# Patient Record
Sex: MALE | Race: WHITE | URBAN - METROPOLITAN AREA
[De-identification: names, ages, dates, MRNs, and addresses within clinical notes are randomized per-mention and may not be internally consistent; named-entity substitution may affect disease eponyms.]

---

## 2019-04-22 ENCOUNTER — OFFICE VISIT (OUTPATIENT)
Dept: ORTHOPEDIC SURGERY | Age: 59
End: 2019-04-22

## 2019-04-22 VITALS
SYSTOLIC BLOOD PRESSURE: 129 MMHG | RESPIRATION RATE: 11 BRPM | DIASTOLIC BLOOD PRESSURE: 76 MMHG | TEMPERATURE: 97.8 F | HEART RATE: 66 BPM | HEIGHT: 72 IN | OXYGEN SATURATION: 95 % | BODY MASS INDEX: 33.18 KG/M2 | WEIGHT: 245 LBS

## 2019-04-22 DIAGNOSIS — M76.61 ACHILLES TENDINITIS OF RIGHT LOWER EXTREMITY: Primary | ICD-10-CM

## 2019-04-22 DIAGNOSIS — M25.571 ACUTE RIGHT ANKLE PAIN: ICD-10-CM

## 2019-04-22 RX ORDER — TELMISARTAN 20 MG/1
TABLET ORAL DAILY
COMMUNITY

## 2019-04-22 RX ORDER — ESOMEPRAZOLE MAGNESIUM 40 MG/1
CAPSULE, DELAYED RELEASE ORAL DAILY
COMMUNITY

## 2019-04-22 RX ORDER — IBUPROFEN 800 MG/1
800 TABLET ORAL
Qty: 60 TAB | Refills: 1 | Status: CANCELLED | OUTPATIENT
Start: 2019-04-22
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
## 2019-04-22 NOTE — PROGRESS NOTES
1. Have you been to the ER, urgent care clinic since your last visit? Hospitalized since your last visit? No    2. Have you seen or consulted any other health care providers outside of the [address] [place] [address] since your last visit? Include any pap smears or colon screening.  No

## 2022-07-27 ENCOUNTER — HOSPITAL ENCOUNTER (OUTPATIENT)
Dept: PHYSICAL THERAPY | Age: 62
Discharge: HOME | End: 2022-07-27
Payer: COMMERCIAL

## 2022-07-27 PROCEDURE — 97162 PT EVAL MOD COMPLEX 30 MIN: CPT

## 2022-07-27 NOTE — PROGRESS NOTES
[facility]  [address] [facility] [address]  [place], [address]  [phone] fax    Plan of Care/ Statement of Necessity for Physical Therapy Services    Patient name: Theodora Greenwood Start of Care: 2022   Referral source: Trent Gonzalez* : 1960    Medical Diagnosis: s/p tendon repair  Payor: Sherin Chowdary / Plan: [employer] / Product Type: HMO /  Onset Date:7/15/2022    Treatment Diagnosis: right distal biceps tendon repair   Prior Hospitalization: see medical history Provider#: [id number]   Medications: Verified on Patient summary List    Comorbidities: HTN, distal biceps tendon repair   Prior Level of Function: The patient reports inability to use right elbow/arm due to tear of biceps pre-operatively. The Plan of Care and following information is based on the information from the initial evaluation. Assessment/ key information: The patient is a 59year old left handed male s/p right distal biceps repair performed on 7/15/2022. He indicates he has been doing well overall since surgery until a brace was placed on his elbow at last follow-up and he felt the straps were too tight, his right hand swelled significantly and he experienced skin break down where the straps contacted his forearm in 3 different areas. Since that time he has loosened them up somewhat and placed moleskin under the straps to allow for improved comfort. He did not bring protocol with him to session. The patient presents steri strips over incision over distal biceps region. The proximal arm of his brace was extended to allow for improved stability about the elbow. New moleskin was cut and placed beneath the straps to allow for reduced friction and to mitigate further skin break down. No further objective data was collected today due to absence of protocol, though patient was educated regarding no active flexion, to remain in brace, and to avoid active supination.  The patient has impairments related to the procedure consisting of pain, decreased ROM, decreased flexibility, decreased strength, decreased ease of ADLs, limited quality of life. A call has been placed to MD office to obtain op report, protocol, as well as an OT order due to forearm and elbow speciality. Evaluation Complexity History MEDIUM  Complexity : 1-2 comorbidities / personal factors will impact the outcome/ POC ; Examination MEDIUM Complexity : 3 Standardized tests and measures addressing body structure, function, activity limitation and / or participation in recreation  ;Presentation MEDIUM Complexity : Evolving with changing characteristics  ; Clinical Decision Making MEDIUM Complexity : FOTO score of 26-74  Overall Complexity Rating: MEDIUM  Problem List: pain affecting function, decrease ROM, decrease strength, edema affecting function, impaired gait/ balance, decrease ADL/ functional abilitiies, decrease activity tolerance, decrease flexibility/ joint mobility, and decrease transfer abilities   Treatment Plan may include any combination of the following: Therapeutic exercise, Therapeutic activities, Neuromuscular re-education, Physical agent/modality, Manual therapy, Patient education, Self Care training, and Functional mobility training  Patient / Family readiness to learn indicated by: asking questions, trying to perform skills, and interest  Persons(s) to be included in education: patient (P)  Barriers to Learning/Limitations: yes;  none  Patient Goal (s): regain flexibility of tendon begin regaining strength. Return to normal arm use.   Patient Self Reported Health Status: good  Rehabilitation Potential: good    Goals: The patient will verbalize understanding and demonstrate compliance of precautions in order to reduce risk of injury during passive phase. Met     Frequency / Duration: Patient to be seen 1 times per week for 1 treatments.     Patient/ Caregiver education and instruction: Diagnosis, prognosis, self care, activity modification, brace/ splint application, and exercises   [x]  Plan of care has been reviewed with KRAIG Guerrero, PT 7/27/2022 9:22 AM    ________________________________________________________________________    I certify that the above Therapy Services are being furnished while the patient is under my care. I agree with the treatment plan and certify that this therapy is necessary.     [de-identified] Signature:____________________  Date:____________Time: _________     Codi Villavicencio*  Please sign and return to [place] [id number] [facility]  [address] [facility] [address]  [place], [address]  [phone] fax

## 2022-07-27 NOTE — PROGRESS NOTES
PT DAILY TREATMENT NOTE     Patient Name: Tanner Ortiz  Date:2022  : 1960  [x]  Patient  Verified  Payor: Abe Munguia / Plan: [employer] / Product Type: HMO /    In time:8:30  Out time:9:15  Total Treatment Time (min): 45  Visit #: 1 of 1    Treatment Area: s/p tendon repair    SUBJECTIVE  Pain Level (0-10 scale): 0/10  Any medication changes, allergies to medications, adverse drug reactions, diagnosis change, or new procedure performed?: [x] No    [] Yes (see summary sheet for update)  Subjective functional status/changes:   [] No changes reported  The patient has a chief complaint of swelling and skin irritation from brace s/p right distal biceps tendon repair 7/15/2022. OBJECTIVE  30 min [x]Eval                  []Re-Eval       15 min Therapeutic Activity:  [x]  See flow sheet :education and refitting of brace to reduce skin break down and allow for improved stability of right elbow. Rationale:  improve fitting of brace   to improve the patients ability to reduce re-injury. With   [] TE   [] TA   [] neuro   [] other: Patient Education: [x] Review HEP    [] Progressed/Changed HEP based on:   [] positioning   [] body mechanics   [] transfers   [] heat/ice application    [] other:      Other Objective/Functional Measures: See IE     Pain Level (0-10 scale) post treatment: 0/10    ASSESSMENT/Changes in Function: See POC    Patient will continue to benefit from skilled PT services to modify and progress therapeutic interventions, address functional mobility deficits, address ROM deficits, address strength deficits, analyze and address soft tissue restrictions, analyze and cue movement patterns, analyze and modify body mechanics/ergonomics, assess and modify postural abnormalities, and instruct in home and community integration to attain remaining goals.      [x]  See Plan of Care  []  See progress note/recertification  []  See Discharge Summary         Progress towards goals / Updated goals:  Goals: The patient will verbalize understanding and demonstrate compliance of precautions in order to reduce risk of injury during passive phase. Met        PLAN  [x]  Upgrade activities as tolerated     []  Continue plan of care  []  Update interventions per flow sheet       []  Discharge due to:_  []  Other:_      Mia Vitale, PT 7/27/2022  9:36 AM    No future appointments.

## 2022-07-29 ENCOUNTER — HOSPITAL ENCOUNTER (OUTPATIENT)
Dept: PHYSICAL THERAPY | Age: 62
Discharge: HOME | End: 2022-07-29
Payer: COMMERCIAL

## 2022-07-29 PROCEDURE — 97110 THERAPEUTIC EXERCISES: CPT

## 2022-07-29 PROCEDURE — 97535 SELF CARE MNGMENT TRAINING: CPT

## 2022-07-29 PROCEDURE — 97165 OT EVAL LOW COMPLEX 30 MIN: CPT

## 2022-07-29 NOTE — PROGRESS NOTES
[facility]  [address]  [place], [address]  [phone] fax    Plan of Care/Statement of Necessity for Occupational Therapy Services    Patient name: Joanette Boast Start of Care: 2022   Referral source: Bubba Nolasco* : 1960    Medical Diagnosis: Pain in right elbow [M25.521]  Payor: Joleen Vega / Plan: [address] [employer] / Product Type: HMO /  Onset Date:2022    Treatment Diagnosis: right elbow pain   Prior Hospitalization: see medical history Provider#: [id number]   Medications: Verified on Patient summary List    Comorbidities: Heart disease, HTN   Prior Level of Function:  (I) with ADL/IADL tasks without functional limitations and pain using right UE, walking, biking, cooking        The Plan of Care and following information is based on the information from the initial evaluation. Assessment/ key information:   Patient is a left hand dominant 59 y.o. male with a chief complaint  of right arm pain s/p right distal biceps repair on 7/15/2022. Pt reports initial injury in 2022 when he went to try to stop his large dog from running out the door and her felt a painful pop in the arm. Imaging revealed a partial tear and he was scheduled for follow up. While waiting for follow up, he had two more instances of painful popping in the right elbow and an MRI revealed a complete rupture of the distal biceps tendon which lead to surgical intervention. Pt presents to skilled OT today rating his pain level 1-2/10 in the right arm with an elbow brace donned adjusted at 90 degrees. There is moleskin covering 3 wounds about the volar forearm from how the elbow brace was fitting prior to skilled services. There are steri strips over the prox forearm at the distal biceps repair that are intact. He is able to oppose the thumb to all finger tips and make a composite fist.  There is moderate post op edema noted in the right UE.   Patient received an initial evaluation today followed by education as to diagnosis, precautions and treatment plan. Patient was provided with a basic home exercise program including shoulder and hand AROM. Pt issued compression garments for wear on the right elbow, FA, wrist and hand. Pt educated on edema mgmt strategies. Skilled OT services are necessary to address aforementioned deficits and improve quality of life. Evaluation Complexity: History LOW Complexity : Brief history review  Examination LOW Complexity : 1-3 performance deficits relating to physical, cognitive , or psychosocial skils that result in activity limitations and / or participation restrictions  Clinical Decision Making MEDIUM Complexity : Patient may present with comorbidities that affect occupational performnce. Miniml to moderate modification of tasks or assistance (eg, physical or verbal ) with assesment(s) is necessary to enable patient to complete evaluation   Overall Complexity Rating: LOW   Patient would benefit from OT/Hand therapy services for the following problems:  Problem List: Pain effecting function, Decreased range of motion, Decreased strength, Edema effecting function, Decreased coordination/prehension, Decreased ADL/functional abilities , Decreased activity tolerance, Decreased flexibility/joint mobility, and Sensability   Treatment Plan may include any combination of the following: Therapeutic exercise, Therapeutic activities, Neuromuscular re-education, Physical agent/modality, Scar management, Manual therapy, Patient education, and ADLs/IADLs  Patient / Family readiness to learn indicated by: asking questions, trying to perform skills, and interest  Persons(s) to be included in education:   patient (P)  Barriers to Learning/Limitations: None  Patient Goal (s): regain flexibility in tendon. Begin to regain stregnth. Return to normal arm function/use.   Patient Self Reported Health Status: good  Rehabilitation Potential: good  Short Term Goals: To be accomplished in 2  weeks:  Goal:* Patient will be compliant with initial home exercise program to take an active role in their rehabilitation process. Status at Eval: Patient was provided with a basic home exercise program including shoulder and hand AROM. Goal:* Patient will demonstrate a good understanding of their condition and strategies for self-management. Status at Eval: pt educated on  prognosis, diagnosis, activity modifications, treatment plan, precautions, edema mgmt    Long Term Goals: To be accomplished in 4 weeks:    Goal:* Patient will have 70 degrees of right forearm supination in order to perform ADL's including washing face and accepting change. Status at Eval: not assessed due to protocol from referring provider, see chart    Goal:* Patient will have 70 degrees of right forearm pronation in order to type and utilize computer mouse as well as bear wt thru the hand when pushing up from a chair. Status at Eval: not assessed due to protocol from referring provider, see chart    Goal:* Patient will have 15 degrees of right elbow extension in order to increase ease with ADL's such as bathing, eating and dressing and to eventually bear weight thru the right upper extremity as in pushing up from a chair. Status at Eval: Status at Eval: not assessed due to protocol from referring provider, see chart    Goal:* Patient will have 130 degrees of right elbow flexion in order to perform hygiene tasks and /or work with tools such as a screwdriver. Status at Eval:Status at Eval: not assessed due to protocol from referring provider, see chart    Goal:* Patient will demonstrate a 0.4 cm decrease in edema of the right wrist and distal palmar crease and the elbow in order to improve use of right UE for grooming, bathing and dressing tasks.   Status at Eval: [facility] 23.5, wrist 20.2, elbow 34.3 cm    Goal:* Patient will show a 20 point improvement on FOTO functional status measure to improve overall functional performance. Status at [facility]: 26    Frequency / Duration: Patient to be seen 2 times per week for 4 weeks:  Patient/ Caregiver education and instruction: Diagnosis, prognosis, self care, activity modification, and exercises    [x]  Plan of care has been reviewed with Raj Proctor OT 7/29/2022 3:10 PM  ________________________________________________________________________    I certify that the above Therapy Services are being furnished while the patient is under my care. I agree with the treatment plan and certify that this therapy is necessary.     [de-identified] Signature:____________Date:_________TIME:________     [address]  ** Signature, Date and Time must be completed for valid certification **    Please sign and return to [facility] [address] [place] [address] & [facility]  [address]  [place] [address]  [phone] fax

## 2022-07-29 NOTE — PROGRESS NOTES
Hand Therapy Evaluation and Daily Note    Patient Name: Shanda Srivastava  Date:2022  : 1960  Age: 59 y.o.y/o  [x]  Patient  Verified  Payor: Marleny Oiler / Plan: [employer] HMO / Product Type: HMO /    Referring Provider: Kayleigh Butler MD Visit:  2022  Onset Date:  2022  Surgical Date: 7/15/2022  Surgical Procedure: right distal biceps    In time:9:22 AM  Out time:10:03 AM  Total Treatment Time (min): 41  Total Timed Codes (min): 26  1:1 Treatment Time (MC only): 41   Visit #: 1 of 8    Treatment Area: s/p tendon repair    Precautions:    Hand Dominance: left handed   Hand Involved: right    Total Evaluation Time:  15    History of Present Condition:  Patient is a left hand dominant 59 y.o. male with a chief complaint  of right arm pain s/p right distal biceps repair on 7/15/2022. Pt reports initial injury in 2022 when he went to try to stop his large dog from running out the door and her felt a painful pop in the arm. Imaging revealed a partial tear and he was scheduled for follow up. While waiting for follow up, he had two more instances of painful popping in the right elbow and an MRI revealed a complete rupture of the distal biceps tendon which lead to surgical intervention. Pain Rating:   Current: (0-no pain 10-debilitating pain) mild 1-2/10  At best: (0-no pain 10-debilitating pain) mild 1/10  At worst: (0-no pain 10-debilitating pain) severe 8/10  Location: right elbow  Type:  mild   Better with: ibuprofen  Worse with:     Medications/Allergies/Past Medical History:  See chart; reviewed with patient.  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 23.5/22.2    Wrist Crease 20.2/18.7    FA  31.7/29.9    Elbow 34.3/29.9          Special Tests: NA    ADLs  Feeding:        []MaxA   []ModA   []Cory   [] CGA   []SBA   []Dada   [x]Independent  UE Dressing:       []MaxA   []ModA   [x]Cory   [] CGA   []SBA   []Dada   []Independent  LE Dressing:       []MaxA   []ModA   [x]Cory   [] CGA   []SBA   []Dada   []Independent  Grooming:       []MaxA   []ModA   []Cory   [] CGA   []SBA   []Dada   [x]Independent  Toileting:       []MaxA   []ModA   []Cory   [] CGA   []SBA   []Dada   [x]Independent  Bathing:       []MaxA   []ModA   [x]Cory   [] CGA   []SBA   []Dada   []Independent  Light Meal Prep:    []MaxA   []ModA   [x]Cory   [] CGA   []SBA   []Dada   []Independent  Household/Other: []MaxA   []ModA   [x]Cory   [] CGA   []SBA   []Dada   []Independent  Adaptive Equip:     []MaxA   []ModA   []Cory   [] CGA   []SBA   []Dada   []Independent  Driving:       []MaxA   []ModA   []Cory   [] CGA   []SBA   []Dada   [x]Independent      Todays Treatment:  Patient received an initial evaluation today followed by education as to diagnosis, precautions and treatment plan. Patient was provided with a basic home exercise program including shoulder and hand AROM. Pt issued compression garments for wear on the right elbow, FA, wrist and hand. Pt educated on edema mgmt strategies.        OBJECTIVE  8 min Therapeutic Exercise:  [x] See flow sheet :   Rationale: increase ROM to improve the patients ability to move right shoulder and make a composite fist with right hand    18 min Self Care/Home Management: prognosis, diagnosis, activity modifications, treatment plan, precautions, edema mgmt   Rationale:  education   to improve the patients ability to promote healing of surgical sites. With   [] TE   [] TA   [] neuro   [] other: Patient Education: [x] Review HEP    [] Progressed/Changed HEP based on:   [] positioning   [] body mechanics   [] transfers   [] heat/ice application   [] Splint wear/care   [] Sensory re-education   [] scar management      [] other:      Pain Level (0-10 scale) post treatment: 1/10    Patient will continue to benefit from skilled OT services to modify and progress therapeutic interventions, address ROM deficits, address strength deficits, analyze and address soft tissue restrictions, and instruct in home and community integration to attain goals. Assessment: Pt presents to skilled OT today rating his pain level 1-2/10 in the right arm with an elbow brace donned adjusted at 90 degrees. There is moleskin covering 3 wounds about the volar forearm from how the elbow brace was fitting prior to skilled services. There are steri strips over the prox forearm at the distal biceps repair that are intact. He is able to oppose the thumb to all finger tips and make a composite fist.  There is moderate post op edema noted in the right UE. Evaluation Complexity: History LOW Complexity : Brief history review  Examination LOW Complexity : 1-3 performance deficits relating to physical, cognitive , or psychosocial skils that result in activity limitations and / or participation restrictions  Clinical Decision Making MEDIUM Complexity : Patient may present with comorbidities that affect occupational performnce.  Miniml to moderate modification of tasks or assistance (eg, physical or verbal ) with assesment(s) is necessary to enable patient to complete evaluation   Overall Complexity Rating: LOW     Patient would benefit from OT/Hand therapy services for the following problems:  Problem List: Pain effecting function, Decreased range of motion, Decreased strength, Edema effecting function, Decreased coordination/prehension, Decreased ADL/functional abilities , Decreased activity tolerance, Decreased flexibility/joint mobility, and Sensability     Treatment Plan may include any combination of the following: Therapeutic exercise, Therapeutic activities, Neuromuscular re-education, Physical agent/modality, Scar management, Manual therapy, Patient education, and ADLs/IADLs    Patient / Family readiness to learn indicated by: asking questions, trying to perform skills, and interest    Persons(s) to be included in education:   patient (P)    Barriers to Learning/Limitations: None    Patient Goal (s): regain flexibility in tendon. Begin to regain stregnth. Return to normal arm function/use.     Patient Self Reported Health Status: good    Rehabilitation Potential: good    Short Term Goals: To be accomplished in 2  weeks:  Goal:* Patient will be compliant with initial home exercise program to take an active role in their rehabilitation process. Status at [facility]: Patient was provided with a basic home exercise program including shoulder and hand AROM. Goal:* Patient will demonstrate a good understanding of their condition and strategies for self-management. Status at [facility]: pt educated on  prognosis, diagnosis, activity modifications, treatment plan, precautions, edema mgmt    Long Term Goals: To be accomplished in 4 weeks:    Goal:* Patient will have 70 degrees of right forearm supination in order to perform ADL's including washing face and accepting change. Status at [facility]: not assessed due to protocol from referring provider, see chart    Goal:* Patient will have 70 degrees of right forearm pronation in order to type and utilize computer mouse as well as bear wt thru the hand when pushing up from a chair.   Status at [facility]: not assessed due to protocol from referring provider, see chart    Goal:* Patient will have 15 degrees of right elbow extension in order to increase ease with ADL's such as bathing, eating and dressing and to eventually bear weight thru the right upper extremity as in pushing up from a chair. Status at Eval: Status at Eval: not assessed due to protocol from referring provider, see chart    Goal:* Patient will have 130 degrees of right elbow flexion in order to perform hygiene tasks and /or work with tools such as a screwdriver. Status at Eval:Status at Eval: not assessed due to protocol from referring provider, see chart    Goal:* Patient will demonstrate a 0.4 cm decrease in edema of the right wrist and distal palmar crease and the elbow in order to improve use of right UE for grooming, bathing and dressing tasks. Status at Eval: [facility] [place] 23.5, wrist 20.2, elbow 34.3 cm    Goal:* Patient will show a 20 point improvement on FOTO functional status measure to improve overall functional performance.   Status at eval: 26    Frequency / Duration: Patient to be seen 2 times per week for 4 weeks:  Patient/ Caregiver education and instruction: Diagnosis, prognosis, self care, activity modification, and exercises    Deya Mancini OT,  7/29/2022 9:10 AM

## 2022-08-02 ENCOUNTER — HOSPITAL ENCOUNTER (OUTPATIENT)
Dept: PHYSICAL THERAPY | Age: 62
Discharge: HOME | End: 2022-08-02
Payer: COMMERCIAL

## 2022-08-02 PROCEDURE — 97535 SELF CARE MNGMENT TRAINING: CPT

## 2022-08-02 PROCEDURE — 97110 THERAPEUTIC EXERCISES: CPT

## 2022-08-02 PROCEDURE — 97140 MANUAL THERAPY 1/> REGIONS: CPT

## 2022-08-02 NOTE — PROGRESS NOTES
OT DAILY TREATMENT NOTE     Patient Name: Jerry Correia  Date:2022  : 1960  [x]  Patient  Verified  Payor: Flakita Carmel / Plan: [employer] / Product Type: HMO /    In time:10:48  Out time:11:32  Total Treatment Time (min): 44  Visit #: 2 of 8    Treatment Area: Pain in right elbow [M25.521]    SUBJECTIVE  Pain Level (0-10 scale): 1/10  Any medication changes, allergies to medications, adverse drug reactions, diagnosis change, or new procedure performed?: [x] No    [] Yes (see summary sheet for update)  Subjective functional status/changes:   [] No changes reported    \"I have had swelling\"  \"My fingers are stiff\"  \"I go to the doctors on Friday to adjust my elbow brace\"    OBJECTIVE    Modality rationale: decrease edema, decrease inflammation, decrease pain, and increase tissue extensibility to improve the patients ability to move elbow for functional daily tasks.     Min Type Additional Details    [] Estim:  []Unatt       []IFC  []Premod                        []Other:  []w/ice   []w/heat  Position:  Location:    [] Estim: []Att    []TENS instruct  []NMES                    []Other:  []w/US   []w/ice   []w/heat  Position:  Location:    []  Traction: [] Cervical       []Lumbar                       [] Prone          []Supine                       []Intermittent   []Continuous Lbs:  [] before manual  [] after manual    []  Ultrasound: []Continuous   [] Pulsed                           []1MHz   []3MHz W/cm2:  Location:    []  Iontophoresis with dexamethasone         Location: [] Take home patch   [] In clinic   8 with self-care []  Ice     [x]  Heat- MHP   []  Ice massage  []  Laser   []  Paraffin Position: seated/resting  Location: right elbow    []  Laser with stim  []  Other:  Position:  Location:    []  Vasopneumatic Device    []  Right     []  Left  Pre-treatment girth:  Post-treatment girth:  Measured at (location):  Pressure:       [] lo [] med [] hi   Temperature: [] lo [] med [] hi     [x] Skin assessment post-treatment:  [x]intact []redness- no adverse reaction    []redness - adverse reaction:     14 min Therapeutic Exercise:  [] See flow sheet :   Rationale: increase ROM to improve the patients ability to move right shoulder and make a composite fist with right hand    Right UE: (all exercises performed with elbow brace donned)    Shrugs and shoulder rolls   Opposition - all digits   Tendon glides     12 min Manual Therapy:  IASTM #4, #5 , #6 using sweeping fanning techniques over hand wrist and forearm. The manual therapy interventions were performed at a separate and distinct time from the therapeutic activities interventions. Rationale: decrease pain, increase ROM, increase tissue extensibility, and decrease edema  to move wrist and make a composite fist for functional daily tasks. 18 min Self Care/Home Management: treatment plan, edema management, heat modalities, protocol. Rationale:  patient education   to improve the patients ability to self-manage symptoms and improve overall functional use of right UE. With   [] TE   [] TA   [] neuro   [] other: Patient Education: [x] Review HEP    [x] Progressed/Changed HEP based on: initiated tendon glide HEP   [] positioning   [] body mechanics   [] transfers   [] heat/ice application   [] Splint wear/care   [] Sensory re-education   [] scar management      [] other:            Other Objective/Functional Measures: All exercises performed with elbow brace donned  Pt tolerated all shoulder and digit ROM without increased pain     Pain Level (0-10 scale) post treatment: 1/10    ASSESSMENT/Changes in Function: pt familiar and compliant with protocol , precautions, and treatment plan. No increase in pain with shoulder AROM and digit AROM, initiated edema massage in right hand,wrist, and forearm. pt tolerated edema massage well.      Patient will continue to benefit from skilled OT services to address ROM deficits, address strength deficits, analyze and address soft tissue restrictions, analyze and cue movement patterns, and analyze and modify body mechanics/ergonomics to attain remaining goals. [x]  See Plan of Care  []  See progress note/recertification  []  See Discharge Summary         Progress towards goals / Updated goals:    Short Term Goals: To be accomplished in 2  weeks:  Goal:* Patient will be compliant with initial home exercise program to take an active role in their rehabilitation process. Status at Eval: Patient was provided with a basic home exercise program including shoulder and hand AROM.   8/2/2022: initiated tendon glide HEP. Goal:* Patient will demonstrate a good understanding of their condition and strategies for self-management. Status at Eval: pt educated on  prognosis, diagnosis, activity modifications, treatment plan, precautions, edema mgmt  8/2/2022: reviewed edema management strategies and use of heat modalities. Long Term Goals: To be accomplished in 4 weeks:           Goal:* Patient will have 70 degrees of right forearm supination in order to perform ADL's including washing face and accepting change. Status at Eval: not assessed due to protocol from referring provider, see chart. Goal:* Patient will have 70 degrees of right forearm pronation in order to type and utilize computer mouse as well as bear wt thru the hand when pushing up from a chair. Status at Eval: not assessed due to protocol from referring provider, see chart     Goal:* Patient will have 15 degrees of right elbow extension in order to increase ease with ADL's such as bathing, eating and dressing and to eventually bear weight thru the right upper extremity as in pushing up from a chair.   Status at Eval: Status at Eval: not assessed due to protocol from referring provider, see chart     Goal:* Patient will have 130 degrees of right elbow flexion in order to perform hygiene tasks and /or work with tools such as a screwdriver. Status at Eval:Status at Eval: not assessed due to protocol from referring provider, see chart     Goal:* Patient will demonstrate a 0.4 cm decrease in edema of the right wrist and distal palmar crease and the elbow in order to improve use of right UE for grooming, bathing and dressing tasks. Status at Eval: [facility] [place] 23.5, wrist 20.2, elbow 34.3 cm     Goal:* Patient will show a 20 point improvement on FOTO functional status measure to improve overall functional performance.   Status at eval: 26       PLAN  []  Upgrade activities as tolerated     [x]  Continue plan of care  []  Update interventions per flow sheet       []  Discharge due to:_  []  Other:_      KANG Mckeon 8/2/2022  8:27 AM    Future Appointments   Date Time Provider Boyd Martinez   8/2/2022 10:45 AM KANG Rausch [facility]   8/5/2022  3:15 PM Bart Hamming, OT [facility]   8/9/2022 12:15 PM Bart Hamming, OT [facility]   8/12/2022 12:15 PM KANG Rausch [facility]   8/16/2022  3:15 PM Bart Hamming, OT [facility]   8/19/2022  3:15 PM Bart Hamming, OT [facility]   8/23/2022  3:15 PM Bart Hamming, OT [facility]

## 2022-08-05 ENCOUNTER — HOSPITAL ENCOUNTER (OUTPATIENT)
Dept: PHYSICAL THERAPY | Age: 62
Discharge: HOME | End: 2022-08-05
Payer: COMMERCIAL

## 2022-08-05 PROCEDURE — 97140 MANUAL THERAPY 1/> REGIONS: CPT

## 2022-08-05 PROCEDURE — 97535 SELF CARE MNGMENT TRAINING: CPT

## 2022-08-05 PROCEDURE — 97110 THERAPEUTIC EXERCISES: CPT

## 2022-08-05 NOTE — PROGRESS NOTES
OT DAILY TREATMENT NOTE     Patient Name: Littie Oppenheim  Date:2022  : 1960  [x]  Patient  Verified  Payor: Shruthi Westbrook / Plan: [employer] Burrows HMO / Product Type: HMO /    In time:3:20 PM  Out time:4:15 PM  Total Treatment Time (min): 55  Visit #: 3 of 8    Treatment Area: Pain in right elbow [M25.521]    SUBJECTIVE  Pain Level (0-10 scale): 1/10  Any medication changes, allergies to medications, adverse drug reactions, diagnosis change, or new procedure performed?: [x] No    [] Yes (see summary sheet for update)  Subjective functional status/changes:   [] No changes reported  \"The arm is feeling better so I have to remind myself not to use it. \"    \"I don't like the race because my arm swells with it on. \"    OBJECTIVE    Modality rationale: decrease pain and increase tissue extensibility to improve the patients ability to move right UE   Min Type Additional Details    [] Estim:  []Unatt       []IFC  []Premod                        []Other:  []w/ice   []w/heat  Position:  Location:    [] Estim: []Att    []TENS instruct  []NMES                    []Other:  []w/US   []w/ice   []w/heat  Position:  Location:    []  Traction: [] Cervical       []Lumbar                       [] Prone          []Supine                       []Intermittent   []Continuous Lbs:  [] before manual  [] after manual    []  Ultrasound: []Continuous   [] Pulsed                           []1MHz   []3MHz W/cm2:  Location:    []  Iontophoresis with dexamethasone         Location: [] Take home patch   [] In clinic   8 concurrent with self care []  Ice     [x]  Heat MHP  []  Ice massage  []  Laser   []  Paraffin Position: seated, resting  Location: right UE    []  Laser with stim  []  Other:  Position:  Location:    []  Vasopneumatic Device    []  Right     []  Left  Pre-treatment girth:  Post-treatment girth:  Measured at (location):  Pressure:       [] lo [] med [] hi   Temperature: [] lo [] med [] hi       [x] Skin assessment post-treatment:  [x]intact [x]redness- no adverse reaction    []redness - adverse reaction:     15 min Therapeutic Exercise:  [x] See flow sheet :   Rationale: increase ROM to improve the patients ability to move right UE through pain free AROM/AAROM, improve ability to make a composite fist.      15 min Manual Therapy:  retrograde edema massage to right UE   The manual therapy interventions were performed at a separate and distinct time from the therapeutic activities interventions. Rationale: increase tissue extensibility and decrease edema  to right hand digits, wrist, FA and upper arm    25 min Self Care/Home Management: edema mgmt, arm compression garment wear, digit compression garment wear, elbow brace wear   Rationale:  education   to improve the patients ability to reduce edema in right UE and improve compliance with post op protocol and elbow brace wear. With   [] TE   [] TA   [] neuro   [] other: Patient Education: [x] Review HEP    [] Progressed/Changed HEP based on:   [] positioning   [] body mechanics   [] transfers   [] heat/ice application   [] Splint wear/care   [] Sensory re-education   [] scar management      [] other:            Other Objective/Functional Measures:   Reduced edema with retrograde edema massage   Skins wounds healing well    Pain Level (0-10 scale) post treatment: 1/10    ASSESSMENT/Changes in Function: Pt continues to demonstrate edema of right UE. Increased edema noted in digits therefore patient was issued digit compression sleeves for digits 2-5 and educated on wear. No difficulty noted with in hand manipulation exercises. Adjusted brace per post op protocol in chart to 30 deg extension and 90 degrees flexion. Pt provided and instructed on elbow flexion PROM HEP. Issued new compression garments for right wrist and arm and re-educated on wear and care. Pt reports relief after retrograde edema massage and exercises.   Will progress as protocol allows and will continue to address continued edema. Patient will continue to benefit from skilled OT services to modify and progress therapeutic interventions, address ROM deficits, address strength deficits, analyze and address soft tissue restrictions, and instruct in home and community integration to attain remaining goals. []  See Plan of Care  []  See progress note/recertification  []  See Discharge Summary         Progress towards goals / Updated goals:  Short Term Goals: To be accomplished in 2  weeks:  Goal:* Patient will be compliant with initial home exercise program to take an active role in their rehabilitation process. Status at Eval: Patient was provided with a basic home exercise program including shoulder and hand AROM.   8/2/2022: initiated tendon glide HEP. Goal:* Patient will demonstrate a good understanding of their condition and strategies for self-management. Status at Eval: pt educated on  prognosis, diagnosis, activity modifications, treatment plan, precautions, edema mgmt  8/2/2022: reviewed edema management strategies and use of heat modalities. Long Term Goals: To be accomplished in 4 weeks:           Goal:* Patient will have 70 degrees of right forearm supination in order to perform ADL's including washing face and accepting change. Status at Eval: not assessed due to protocol from referring provider, see chart. Goal:* Patient will have 70 degrees of right forearm pronation in order to type and utilize computer mouse as well as bear wt thru the hand when pushing up from a chair. Status at Eval: not assessed due to protocol from referring provider, see chart     Goal:* Patient will have 15 degrees of right elbow extension in order to increase ease with ADL's such as bathing, eating and dressing and to eventually bear weight thru the right upper extremity as in pushing up from a chair.   Status at Eval: Status at Eval: not assessed due to protocol from referring provider, see chart Goal:* Patient will have 130 degrees of right elbow flexion in order to perform hygiene tasks and /or work with tools such as a screwdriver. Status at Eval:Status at Eval: not assessed due to protocol from referring provider, see chart     Goal:* Patient will demonstrate a 0.4 cm decrease in edema of the right wrist and distal palmar crease and the elbow in order to improve use of right UE for grooming, bathing and dressing tasks. Status at Eval: [facility] [place] 23.5, wrist 20.2, elbow 34.3 cm     Goal:* Patient will show a 20 point improvement on FOTO functional status measure to improve overall functional performance.   Status at eval: 26       PLAN  []  Upgrade activities as tolerated     [x]  Continue plan of care  []  Update interventions per flow sheet       []  Discharge due to:_  []  Other:_      Carolynn De La Vega OT 8/5/2022  3:29 PM    Future Appointments   Date Time Provider Boyd Martinez   8/9/2022 12:15 PM [facility]   8/12/2022 12:15 PM [facility]   8/16/2022  3:15 PM [facility]   8/19/2022  3:15 PM [facility]   8/23/2022  3:15 PM Leotis Service, OT [facility]

## 2022-08-09 ENCOUNTER — HOSPITAL ENCOUNTER (OUTPATIENT)
Dept: PHYSICAL THERAPY | Age: 62
Discharge: HOME | End: 2022-08-09
Payer: COMMERCIAL

## 2022-08-09 PROCEDURE — 97140 MANUAL THERAPY 1/> REGIONS: CPT

## 2022-08-09 PROCEDURE — 97535 SELF CARE MNGMENT TRAINING: CPT

## 2022-08-09 PROCEDURE — 97110 THERAPEUTIC EXERCISES: CPT

## 2022-08-09 NOTE — PROGRESS NOTES
OT DAILY TREATMENT NOTE     Patient Name: Marco Sewell  Date:2022  : 1960  [x]  Patient  Verified  Payor: Nai Almendarez / Plan: [employer] / Product Type: HMO /    In time:12:15 PM  Out time:1:01 PM  Total Treatment Time (min): 55  Visit #: 4 of 8    Treatment Area: Pain in right elbow [M25.521]    SUBJECTIVE  Pain Level (0-10 scale): 1/10  Any medication changes, allergies to medications, adverse drug reactions, diagnosis change, or new procedure performed?: [x] No    [] Yes (see summary sheet for update)  Subjective functional status/changes:   [] No changes reported  \"I don't have a lot of pain but the swelling has been hard to manage. \"    \"I wear the compression and try to elevate it. \"    OBJECTIVE            Modality rationale: decrease pain and increase tissue extensibility to improve the patients ability to move right UE   Min Type Additional Details      [] Estim:  []Unatt       []IFC  []Premod                        []Other: []w/ice   []w/heat  Position:  Location:      [] Estim: []Att    []TENS instruct  []NMES                    []Other: []w/US   []w/ice   []w/heat  Position:  Location:      []  Traction: [] Cervical       []Lumbar                       [] Prone          []Supine                       []Intermittent   []Continuous Lbs:  [] before manual  [] after manual      []  Ultrasound: []Continuous   [] Pulsed                           []1MHz   []3MHz W/cm2:  Location:      []  Iontophoresis with dexamethasone        Location: [] Take home patch  [] In clinic    5 concurrent with self care []  Ice     [x]  Heat MHP  []  Ice massage  []  Laser  []  Paraffin Position: seated, resting  Location: right UE      []  Laser with stim  []  Other: Position:  Location:      []  Vasopneumatic Device    []  Right     []  Left  Pre-treatment girth:  Post-treatment girth:  Measured at (location): Pressure:       [] lo [] med [] hi  Temperature: [] lo [] med [] hi          [x] Skin assessment post-treatment:  [x]intact [x]redness- no adverse reaction    []redness - adverse reaction:      16 min Therapeutic Exercise:  [x] See flow sheet :   Rationale: increase ROM to improve the patients ability to move right UE through pain free AROM/AAROM, improve ability to make a composite fist.       15 min Manual Therapy:  IASTM using tool #5 using sweeping and fanning techniques    The manual therapy interventions were performed at a separate and distinct time from the therapeutic activities interventions. Rationale: increase tissue extensibility and decrease edema  to right hand digits, wrist, FA and upper arm     15 min Self Care/Home Management: edema mgmt, arm compression garment wear, digit compression garment wear, elbow brace wear   Rationale:  education   to improve the patients ability to reduce edema in right UE and improve compliance with post op protocol and elbow brace wear. With   [] TE   [] TA   [] neuro   [] other: Patient Education: [x] Review HEP    [] Progressed/Changed HEP based on:   [] positioning   [] body mechanics   [] transfers   [] heat/ice application   [] Splint wear/care   [] Sensory re-education   [] scar management      [] other:            Other Objective/Functional Measures:   Edema: GIRTH CHART measured in cm  Date: 7/29/2022 8/9/2022    Side Right/Left  Right   DPC circum. 23.5/22.2 22.4    Wrist Crease 20.2/18.7 19.7    FA  31.7/29.9  30.3   Elbow 34.3/29.9          31.7         Pain Level (0-10 scale) post treatment: 1/10    ASSESSMENT/Changes in Function: Reduced edema noted in right UE since initial eval. Pt compliant with compression garment wear and reports compliance with edema mgmt strategies including massage, ice and elevation. Good composite fist noted with activity. Mild difficulty manipulating dexterity balls in hand. Pt demo good understanding of elbow flexion/extension AAROM HEP. Will progress as protocol allows. See chart for protocol.     Patient will continue to benefit from skilled OT services to modify and progress therapeutic interventions, address ROM deficits, address strength deficits, analyze and address soft tissue restrictions, and instruct in home and community integration to attain remaining goals. []  See Plan of Care  []  See progress note/recertification  []  See Discharge Summary         Progress towards goals / Updated goals:  Short Term Goals: To be accomplished in 2  weeks:  Goal:* Patient will be compliant with initial home exercise program to take an active role in their rehabilitation process. Status at Eval: Patient was provided with a basic home exercise program including shoulder and hand AROM.   8/2/2022: initiated tendon glide HEP.   8/9/2022 -Pt reports compliance daily, goal met    Goal:* Patient will demonstrate a good understanding of their condition and strategies for self-management. Status at Eval: pt educated on  prognosis, diagnosis, activity modifications, treatment plan, precautions, edema mgmt  8/2/2022: reviewed edema management strategies and use of heat modalities. Long Term Goals: To be accomplished in 4 weeks:           Goal:* Patient will have 70 degrees of right forearm supination in order to perform ADL's including washing face and accepting change. Status at Eval: not assessed due to protocol from referring provider, see chart. Goal:* Patient will have 70 degrees of right forearm pronation in order to type and utilize computer mouse as well as bear wt thru the hand when pushing up from a chair. Status at Eval: not assessed due to protocol from referring provider, see chart     Goal:* Patient will have 15 degrees of right elbow extension in order to increase ease with ADL's such as bathing, eating and dressing and to eventually bear weight thru the right upper extremity as in pushing up from a chair.   Status at Eval: Status at Eval: not assessed due to protocol from referring provider, see chart Goal:* Patient will have 130 degrees of right elbow flexion in order to perform hygiene tasks and /or work with tools such as a screwdriver. Status at Eval:Status at Eval: not assessed due to protocol from referring provider, see chart     Goal:* Patient will demonstrate a 0.4 cm decrease in edema of the right wrist and distal palmar crease and the elbow in order to improve use of right UE for grooming, bathing and dressing tasks. Status at Eval: [facility] 23.5, wrist 20.2, elbow 34.3 cm   8/9/2022 -[facility] 22.4, wrist 19.7, elbow 31.7, goal met for this treatment session    Goal:* Patient will show a 20 point improvement on FOTO functional status measure to improve overall functional performance.   Status at eval: 26       PLAN  []  Upgrade activities as tolerated     [x]  Continue plan of care  [x]  Update interventions per flow sheet       []  Discharge due to:_  []  Other:_      Martinez Whalen OT 8/9/2022  12:08 PM    Future Appointments   Date Time Provider Boyd Martinez   8/9/2022 12:15 PM Chan Hayes, OT [facility]   8/12/2022 12:15 PM KANG Deng [facility]   8/16/2022  3:15 PM Chan Hayes, OT [facility]   8/19/2022  3:15 PM Chan Hayes, OT [facility]   8/23/2022  3:15 PM Chan Hayes, OT [facility]

## 2022-08-12 ENCOUNTER — HOSPITAL ENCOUNTER (OUTPATIENT)
Dept: PHYSICAL THERAPY | Age: 62
Discharge: HOME | End: 2022-08-12
Payer: COMMERCIAL

## 2022-08-12 PROCEDURE — 97140 MANUAL THERAPY 1/> REGIONS: CPT

## 2022-08-12 PROCEDURE — 97535 SELF CARE MNGMENT TRAINING: CPT

## 2022-08-12 PROCEDURE — 97110 THERAPEUTIC EXERCISES: CPT

## 2022-08-12 NOTE — PROGRESS NOTES
OT DAILY TREATMENT NOTE     Patient Name: Jamel Elizabeth  Date:2022  : 1960  [x]  Patient  Verified  Payor: Teodoro Workman / Plan: [employer] / Product Type: HMO /    In time:12:25  Out time:1:04  Total Treatment Time (min): 39  Visit #: 5 of 8    Treatment Area: Pain in right elbow [M25.521]    SUBJECTIVE  Pain Level (0-10 scale): 1/10  Any medication changes, allergies to medications, adverse drug reactions, diagnosis change, or new procedure performed?: [x] No    [] Yes (see summary sheet for update)  Subjective functional status/changes:   [] No changes reported    \"Hand is swollen\"   \"The only issue is wearing my brace\"    OBJECTIVE    Modality rationale: decrease pain and increase tissue extensibility to improve the patients ability to move right UE.     Min Type Additional Details       [] Estim:  []Unatt       []IFC  []Premod                        []Other: []w/ice   []w/heat  Position:  Location:       [] Estim: []Att    []TENS instruct  []NMES                    []Other: []w/US   []w/ice   []w/heat  Position:  Location:       []  Traction: [] Cervical       []Lumbar                       [] Prone          []Supine                       []Intermittent   []Continuous Lbs:  [] before manual  [] after manual       []  Ultrasound: []Continuous   [] Pulsed                           []1MHz   []3MHz W/cm2:  Location:       []  Iontophoresis with dexamethasone        Location: [] Take home patch  [] In clinic     5 concurrent with self care []  Ice     [x]  Heat MHP  []  Ice massage  []  Laser  []  Paraffin Position: seated, resting  Location: right UE       []  Laser with stim  []  Other: Position:  Location:       []  Vasopneumatic Device    []  Right     []  Left  Pre-treatment girth:  Post-treatment girth:  Measured at (location): Pressure:       [] lo [] med [] hi  Temperature: [] lo [] med [] hi        [x] Skin assessment post-treatment:  [x]intact [x]redness- no adverse reaction    []redness - adverse reaction:       19 min Therapeutic Exercise:  [] See flow sheet :   Rationale: increase ROM to improve the patients ability to move wrist and digits for functional daily tasks. Right UE: brace donned     Shoulder ROM   Wrist AROM flex/ext  Elbow flexion AAROM   Tendon glides   Towel scrunches     12 min Manual Therapy:  IASTM#6 using sweeping and fanning techniques over all digits, edema massage over hand , wrist, and forearm. The manual therapy interventions were performed at a separate and distinct time from the therapeutic activities interventions. Rationale: decrease pain, increase ROM, increase tissue extensibility, and decrease edema  to improve wrist ROM and improve ability to make a composite fist.       8 min Self Care/Home Management: precautions, edema management, treatment plan. Rationale:  patient education  to improve the patients ability to self-manage symptoms and improve functional use of right UE. With   [] TE   [] TA   [] neuro   [] other: Patient Education: [x] Review HEP    [x] Progressed/Changed HEP based on: independent with elbow AAROM. [] positioning   [] body mechanics   [] transfers   [] heat/ice application   [] Splint wear/care   [] Sensory re-education   [] scar management      [] other:            Other Objective/Functional Measures:     Pt tolerated all wrist and elbow ROM  Pt able to to don doff brace with minimal assistance. Pain Level (0-10 scale) post treatment: 1/10    ASSESSMENT/Changes in Function: pt is adhering to all precautions and understands current protocol with ROM exercises, no increase in pain with ROM exercises, decreased stiffness with heat and massage modalities. Patient will continue to benefit from skilled OT services to address ROM deficits, analyze and address soft tissue restrictions, analyze and cue movement patterns, and analyze and modify body mechanics/ergonomics to attain remaining goals.      [x]  See Plan of Care  [] See progress note/recertification  []  See Discharge Summary         Progress towards goals / Updated goals:    Short Term Goals: To be accomplished in 2  weeks:  Goal:* Patient will be compliant with initial home exercise program to take an active role in their rehabilitation process. Status at Eval: Patient was provided with a basic home exercise program including shoulder and hand AROM.   8/2/2022: initiated tendon glide HEP.   8/9/2022 -Pt reports compliance daily, goal met     Goal:* Patient will demonstrate a good understanding of their condition and strategies for self-management. Status at Eval: pt educated on  prognosis, diagnosis, activity modifications, treatment plan, precautions, edema mgmt  8/2/2022: reviewed edema management strategies and use of heat modalities. Long Term Goals: To be accomplished in 4 weeks:           Goal:* Patient will have 70 degrees of right forearm supination in order to perform ADL's including washing face and accepting change. Status at Eval: not assessed due to protocol from referring provider, see chart. Goal:* Patient will have 70 degrees of right forearm pronation in order to type and utilize computer mouse as well as bear wt thru the hand when pushing up from a chair. Status at Eval: not assessed due to protocol from referring provider, see chart     Goal:* Patient will have 15 degrees of right elbow extension in order to increase ease with ADL's such as bathing, eating and dressing and to eventually bear weight thru the right upper extremity as in pushing up from a chair. Status at Eval: Status at Eval: not assessed due to protocol from referring provider, see chart     Goal:* Patient will have 130 degrees of right elbow flexion in order to perform hygiene tasks and /or work with tools such as a screwdriver.   Status at Eval:Status at Eval: not assessed due to protocol from referring provider, see chart     Goal:* Patient will demonstrate a 0.4 cm decrease in edema of the right wrist and distal palmar crease and the elbow in order to improve use of right UE for grooming, bathing and dressing tasks. Status at Eval: [facility] 23.5, wrist 20.2, elbow 34.3 cm   8/9/2022 -[facility] 22.4, wrist 19.7, elbow 31.7, goal met for this treatment session     Goal:* Patient will show a 20 point improvement on FOTO functional status measure to improve overall functional performance.   Status at eval: 26          PLAN  []  Upgrade activities as tolerated     [x]  Continue plan of care  []  Update interventions per flow sheet       []  Discharge due to:_  []  Other:_      KANG Huston 8/12/2022  10:41 AM    Future Appointments   Date Time Provider Boyd Martinez   8/12/2022 12:15 PM KANG Upton [facility]   8/16/2022  3:15 PM Gib Cocking, OT [facility]   8/19/2022  3:15 PM Gib Cocking, OT [facility]   8/23/2022  3:15 PM Gib Cocking, OT [facility]

## 2022-08-12 NOTE — PROGRESS NOTES
OT DAILY TREATMENT NOTE     Patient Name: Oumar Cuevas  Date:2022  : 1960  [x]  Patient  Verified  Payor: Kulwinder Zuniga / Plan: [employer] / Product Type: HMO /    In time:***  Out time:***  Total Treatment Time (min): ***  Visit #: *** of ***    Medicare/BCBS Only   Total Timed Codes (min):  *** 1:1 Treatment Time:  ***     Treatment Area: Pain in right elbow [M25.521]    SUBJECTIVE  Pain Level (0-10 scale): ***  Any medication changes, allergies to medications, adverse drug reactions, diagnosis change, or new procedure performed?: [x] No    [] Yes (see summary sheet for update)  Subjective functional status/changes:   [] No changes reported  ***    OBJECTIVE    Modality rationale: decrease edema, decrease pain, and increase tissue extensibility to improve the patients ability to move right UE   Min Type Additional Details    [] Estim:  []Unatt       []IFC  []Premod                        []Other:  []w/ice   []w/heat  Position:  Location:    [] Estim: []Att    []TENS instruct  []NMES                    []Other:  []w/US   []w/ice   []w/heat  Position:  Location:    []  Traction: [] Cervical       []Lumbar                       [] Prone          []Supine                       []Intermittent   []Continuous Lbs:  [] before manual  [] after manual    []  Ultrasound: []Continuous   [] Pulsed                           []1MHz   []3MHz W/cm2:  Location:    []  Iontophoresis with dexamethasone         Location: [] Take home patch   [] In clinic   5 min with self care []  Ice     [x]  Heat- MHP  []  Ice massage  []  Laser   []  Paraffin Position: seated/resting  Location: right UE    []  Laser with stim  []  Other:  Position:  Location:    []  Vasopneumatic Device    []  Right     []  Left  Pre-treatment girth:  Post-treatment girth:  Measured at (location):  Pressure:       [] lo [] med [] hi   Temperature: [] lo [] med [] hi       [] Skin assessment post-treatment:  []intact []redness- no adverse reaction    []redness - adverse reaction:       *** min Therapeutic Exercise:  [] See flow sheet :   Rationale: increase ROM to improve the patients ability to move right UE through pain free AROM/AAROM, improve ability to make a composite fist.      Right UE:      *** min Manual Therapy:  IASTM using tool #5 using sweeping and fanning techniques    The manual therapy interventions were performed at a separate and distinct time from the therapeutic activities interventions. Rationale: increase tissue extensibility and decrease edema  in right hand digits, wrist, FA and upper arm    *** min Self Care/Home Management: ***   Rationale: {BSHSI IMMOTION THER EX:74603:a}  to improve the patients ability to ***    With   [] TE   [] TA   [] neuro   [] other: Patient Education: [x] Review HEP    [] Progressed/Changed HEP based on:   [] positioning   [] body mechanics   [] transfers   [] heat/ice application   [] Splint wear/care   [] Sensory re-education   [] scar management      [] other:            Other Objective/Functional Measures:     Edema: GIRTH CHART measured in cm  Date: 7/29/2022 8/9/2022     Side Right/Left  Right    DPC circum. 23.5/22.2 22.4     Wrist Crease 20.2/18.7 19.7     FA  31.7/29.9  30.3    Elbow 34.3/29.9          31.7         Pain Level (0-10 scale) post treatment: ***    ASSESSMENT/Changes in Function: ***    Patient will continue to benefit from skilled OT services to address strength deficits, analyze and address soft tissue restrictions, analyze and cue movement patterns, and analyze and modify body mechanics/ergonomics to attain remaining goals. [x]  See Plan of Care  []  See progress note/recertification  []  See Discharge Summary         Progress towards goals / Updated goals:    Short Term Goals: To be accomplished in 2  weeks:  Goal:* Patient will be compliant with initial home exercise program to take an active role in their rehabilitation process.   Status at al: Patient was provided with a basic home exercise program including shoulder and hand AROM.   8/2/2022: initiated tendon glide HEP.   8/9/2022 -Pt reports compliance daily, goal met     Goal:* Patient will demonstrate a good understanding of their condition and strategies for self-management. Status at Eval: pt educated on  prognosis, diagnosis, activity modifications, treatment plan, precautions, edema mgmt  8/2/2022: reviewed edema management strategies and use of heat modalities. Long Term Goals: To be accomplished in 4 weeks:           Goal:* Patient will have 70 degrees of right forearm supination in order to perform ADL's including washing face and accepting change. Status at Eval: not assessed due to protocol from referring provider, see chart. Goal:* Patient will have 70 degrees of right forearm pronation in order to type and utilize computer mouse as well as bear wt thru the hand when pushing up from a chair. Status at Eval: not assessed due to protocol from referring provider, see chart     Goal:* Patient will have 15 degrees of right elbow extension in order to increase ease with ADL's such as bathing, eating and dressing and to eventually bear weight thru the right upper extremity as in pushing up from a chair. Status at Eval: Status at Eval: not assessed due to protocol from referring provider, see chart     Goal:* Patient will have 130 degrees of right elbow flexion in order to perform hygiene tasks and /or work with tools such as a screwdriver. Status at Eval:Status at Eval: not assessed due to protocol from referring provider, see chart     Goal:* Patient will demonstrate a 0.4 cm decrease in edema of the right wrist and distal palmar crease and the elbow in order to improve use of right UE for grooming, bathing and dressing tasks.   Status at Eval: [facility] 23.5, wrist 20.2, elbow 34.3 cm   8/9/2022 -[facility] 22.4, wrist 19.7, elbow 31.7, goal met for this treatment session     Goal:* Patient will show a 20 point improvement on FOTO functional status measure to improve overall functional performance.   Status at eval: 26    PLAN  []  Upgrade activities as tolerated     [x]  Continue plan of care  []  Update interventions per flow sheet       []  Discharge due to:_  []  Other:_      Saumya Gaviria 8/12/2022  8:55 AM    Future Appointments   Date Time Provider Boyd Martinez   8/12/2022 12:15 PM KANG Yo Ala [facility]   8/16/2022  3:15 PM Josephine Villa, OT [facility]   8/19/2022  3:15 PM Josephine Handyer, OT [facility]   8/23/2022  3:15 PM Josephinemary ellen Handyer, OT [facility]

## 2022-08-16 ENCOUNTER — HOSPITAL ENCOUNTER (OUTPATIENT)
Dept: PHYSICAL THERAPY | Age: 62
Discharge: HOME | End: 2022-08-16
Payer: COMMERCIAL

## 2022-08-16 PROCEDURE — 97110 THERAPEUTIC EXERCISES: CPT

## 2022-08-16 PROCEDURE — 97140 MANUAL THERAPY 1/> REGIONS: CPT

## 2022-08-16 PROCEDURE — 97535 SELF CARE MNGMENT TRAINING: CPT

## 2022-08-16 NOTE — PROGRESS NOTES
OT DAILY TREATMENT NOTE     Patient Name: Pierce Partida  Date:2022  : 1960  [x]  Patient  Verified  Payor: Becky Gonzalez / Plan: [place] [employer] / Product Type: HMO /    In time:3:20 PM  Out time:4:02 PM  Total Treatment Time (min): 42  Visit #: 6 of 8    Treatment Area: Pain in right elbow [M25.521]    SUBJECTIVE  Pain Level (0-10 scale): 0/10  Any medication changes, allergies to medications, adverse drug reactions, diagnosis change, or new procedure performed?: [x] No    [] Yes (see summary sheet for update)  Subjective functional status/changes:   [] No changes reported  \"I'm not over extending my arm or doing anything I am not supposed to do. \"    OBJECTIVE    Modality rationale: decrease pain and increase tissue extensibility to improve the patients ability to move right UE   Min Type Additional Details    [] Estim:  []Unatt       []IFC  []Premod                        []Other:  []w/ice   []w/heat  Position:  Location:    [] Estim: []Att    []TENS instruct  []NMES                    []Other:  []w/US   []w/ice   []w/heat  Position:  Location:    []  Traction: [] Cervical       []Lumbar                       [] Prone          []Supine                       []Intermittent   []Continuous Lbs:  [] before manual  [] after manual    []  Ultrasound: []Continuous   [] Pulsed                           []1MHz   []3MHz W/cm2:  Location:    []  Iontophoresis with dexamethasone         Location: [] Take home patch   [] In clinic   5 concurrent with self care []  Ice     [x]  Heat MHP  []  Ice massage  []  Laser   []  Paraffin Position:seated, resting  Location: right elbow    []  Laser with stim  []  Other:  Position:  Location:    []  Vasopneumatic Device    []  Right     []  Left  Pre-treatment girth:  Post-treatment girth:  Measured at (location):  Pressure:       [] lo [] med [] hi   Temperature: [] lo [] med [] hi       [x] Skin assessment post-treatment:  [x]intact [x]redness- no adverse reaction []redness - adverse reaction:     22 min Therapeutic Exercise:  [x] See flow sheet :   Rationale: increase ROM and improve coordination to improve the patients ability to move right wrist and hand for coordination activities. 10 min Manual Therapy:  IASTM using tool #5 using sweeping and fanning techniques    The manual therapy interventions were performed at a separate and distinct time from the therapeutic activities interventions. Rationale: increase tissue extensibility and decrease edema  to right hand, wrist, FA and lateral elbow    10 min Self Care/Home Management: compression garment wear, edema mgmt, activity modifications, dycem for gripping   Rationale:  education   to improve the patients ability to reduce symptoms and improve functional use of right hand    With   [] TE   [] TA   [] neuro   [] other: Patient Education: [x] Review HEP    [] Progressed/Changed HEP based on:   [] positioning   [] body mechanics   [] transfers   [] heat/ice application   [] Splint wear/care   [] Sensory re-education   [] scar management      [] other:            Other Objective/Functional Measures:   Edema: GIRTH CHART measured in cm  Date: 7/29/2022 8/9/2022 8/16/2022    Side Right/Left  Right Right   DPC circum. 23.5/22.2 22.4  22.3   Wrist Crease 20.2/18.7 19.7  19.7   FA  31.7/29.9  30.3 28.7   Elbow 34.3/29.9          31.7          29.0         Pain Level (0-10 scale) post treatment: 0/10    ASSESSMENT/Changes in Function: Pt compliance with compression garment wear, splint wear and elbow AAROM HEP. Pt demonstrates reduced right UE edema. Good tolerance to IASTM. Progressing well towards goals. Patient will continue to benefit from skilled OT services to modify and progress therapeutic interventions, address ROM deficits, address strength deficits, analyze and address soft tissue restrictions, analyze and cue movement patterns, and instruct in home and community integration to attain remaining goals. []  See Plan of Care  []  See progress note/recertification  []  See Discharge Summary         Progress towards goals / Updated goals:  Short Term Goals: To be accomplished in 2  weeks:  Goal:* Patient will be compliant with initial home exercise program to take an active role in their rehabilitation process. Status at Eval: Patient was provided with a basic home exercise program including shoulder and hand AROM.   8/2/2022: initiated tendon glide HEP.   8/9/2022 -Pt reports compliance daily, goal met     Goal:* Patient will demonstrate a good understanding of their condition and strategies for self-management. Status at Eval: pt educated on  prognosis, diagnosis, activity modifications, treatment plan, precautions, edema mgmt  8/2/2022: reviewed edema management strategies and use of heat modalities. 8/16/2022 -pt compliant with HEP, edema mgmt strategies and compression garment wear. Goal met     Long Term Goals: To be accomplished in 4 weeks:           Goal:* Patient will have 70 degrees of right forearm supination in order to perform ADL's including washing face and accepting change. Status at Eval: not assessed due to protocol from referring provider, see chart. Goal:* Patient will have 70 degrees of right forearm pronation in order to type and utilize computer mouse as well as bear wt thru the hand when pushing up from a chair. Status at Eval: not assessed due to protocol from referring provider, see chart     Goal:* Patient will have 15 degrees of right elbow extension in order to increase ease with ADL's such as bathing, eating and dressing and to eventually bear weight thru the right upper extremity as in pushing up from a chair. Status at Eval: Status at Eval: not assessed due to protocol from referring provider, see chart     Goal:* Patient will have 130 degrees of right elbow flexion in order to perform hygiene tasks and /or work with tools such as a screwdriver.   Status at Eval:Status at Eval: not assessed due to protocol from referring provider, see chart     Goal:* Patient will demonstrate a 0.4 cm decrease in edema of the right wrist and distal palmar crease and the elbow in order to improve use of right UE for grooming, bathing and dressing tasks. Status at Eval: [facility] 23.5, wrist 20.2, elbow 34.3 cm   8/9/2022 -DPC 22.4, wrist 19.7, elbow 31.7, goal met for this treatment session     Goal:* Patient will show a 20 point improvement on FOTO functional status measure to improve overall functional performance.   Status at eval: 26    PLAN  []  Upgrade activities as tolerated     [x]  Continue plan of care  []  Update interventions per flow sheet       []  Discharge due to:_  []  Other:_      James Ng OT 8/16/2022  1:36 PM    Future Appointments   Date Time Provider Byod Martinez   8/16/2022  3[facility]15 [facility]   8/19/2022  3:15 PM Humberto Lund OT [facility]   8/23/2022  3:15 PM [facility]

## 2022-08-19 ENCOUNTER — HOSPITAL ENCOUNTER (OUTPATIENT)
Dept: PHYSICAL THERAPY | Age: 62
Discharge: HOME | End: 2022-08-19
Payer: COMMERCIAL

## 2022-08-19 PROCEDURE — 97110 THERAPEUTIC EXERCISES: CPT

## 2022-08-19 PROCEDURE — 97140 MANUAL THERAPY 1/> REGIONS: CPT

## 2022-08-19 PROCEDURE — 97535 SELF CARE MNGMENT TRAINING: CPT

## 2022-08-19 PROCEDURE — 97545 WORK HARDENING: CPT

## 2022-08-19 NOTE — PROGRESS NOTES
OT DAILY TREATMENT NOTE     Patient Name: Kori Yoon  Date:2022  : 1960  [x]  Patient  Verified  Payor: Mikie Childs / Plan: [place] [employer] / Product Type: HMO /    In time:3:20 PM  Out time:4:07 PM  Total Treatment Time (min): 52  Visit #: 7 of 8    Treatment Area: Pain in right elbow [M25.521]    SUBJECTIVE  Pain Level (0-10 scale): 0/10  Any medication changes, allergies to medications, adverse drug reactions, diagnosis change, or new procedure performed?: [x] No    [] Yes (see summary sheet for update)  Subjective functional status/changes:   [] No changes reported  \"I took the steri strips off and I am impressed with how good it looks. \"    \"I was able to use that blue stuff to open the water bottle so that was really helpful. \"    OBJECTIVE    Modality rationale: decrease pain and increase tissue extensibility to improve the patients ability to move right UE   Min Type Additional Details    [] Estim:  []Unatt       []IFC  []Premod                        []Other:  []w/ice   []w/heat  Position:  Location:    [] Estim: []Att    []TENS instruct  []NMES                    []Other:  []w/US   []w/ice   []w/heat  Position:  Location:    []  Traction: [] Cervical       []Lumbar                       [] Prone          []Supine                       []Intermittent   []Continuous Lbs:  [] before manual  [] after manual    []  Ultrasound: []Continuous   [] Pulsed                           []1MHz   []3MHz W/cm2:  Location:    []  Iontophoresis with dexamethasone         Location: [] Take home patch   [] In clinic   5 concurrent with self care []  Ice     [x]  Heat MHP  []  Ice massage  []  Laser   []  Paraffin Position:seated, resting  Location: right elbow, right wrist    []  Laser with stim  []  Other:  Position:  Location:    []  Vasopneumatic Device    []  Right     []  Left  Pre-treatment girth:  Post-treatment girth:  Measured at (location):  Pressure:       [] lo [] med [] hi   Temperature: [] lo [] med [] hi       [x] Skin assessment post-treatment:  [x]intact [x]redness- no adverse reaction    []redness - adverse reaction:     29 min Therapeutic Exercise:  [x] See flow sheet :   Rationale: increase ROM and improve coordination to improve the patients ability to move right UE and manipulate items using right hand    10 min Manual Therapy:  IASTM using tools #4, 6 using sweeping and fanning techniques    The manual therapy interventions were performed at a separate and distinct time from the therapeutic activities interventions. Rationale: increase tissue extensibility and decrease edema  to left hand, wrist and forearm    8 min Self Care/Home Management: activity modifications, treatment plan, precautions, edema mgmt   Rationale:  education   to improve the patients ability to reduce symptoms and understand treatment plan to return to PLOF    With   [] TE   [] TA   [] neuro   [] other: Patient Education: [x] Review HEP    [] Progressed/Changed HEP based on:   [] positioning   [] body mechanics   [] transfers   [] heat/ice application   [] Splint wear/care   [] Sensory re-education   [] scar management      [] other:            Other Objective/Functional Measures:   4 cm incision in prox forearm, intact and healed    Range of Motion:   Elbow/Wrist   Wrist 8/19/2022  Right   Flex 43   Ext 71   UD 32   RD 27   FA     Pro 75   Sup 58   Elbow    Flex 120   Ext 18     Edema: GIRTH CHART measured in cm  Date: 7/29/2022 8/9/2022 8/16/2022 8/19/2022    Side Right/Left  Right Right Right   DPC circum. 23.5/22.2 22.4  22.3 21.8   Wrist Crease 20.2/18.7 19.7  19.7 19.7   FA  31.7/29.9  30.3 28.7 29.1   Elbow 34.3/29.9          31.7          29.0        30.4                   Pain Level (0-10 scale) post treatment: 0/10    ASSESSMENT/Changes in Function: Assessed right UE AROM this treatment session with pain or discomfort reported from the patient.  Pt demonstrates compliance with compression garment wear and elbow brace wear. He has met goals for reduced edema of the right UE and verbalizes good understanding of edema mgmt strategies. He continues to be limited in right UE AROM which is typical for his recovery. He is compliant with post op protocol. Will benefit to continue to address right UE AROM limitations and progress as protocol allows (see chart). Patient will continue to benefit from skilled OT services to modify and progress therapeutic interventions, address ROM deficits, address strength deficits, analyze and address soft tissue restrictions, and instruct in home and community integration to attain remaining goals. []  See Plan of Care  [x]  See progress note/recertification  []  See Discharge Summary         Progress towards goals / Updated goals:  Short Term Goals: To be accomplished in 2  weeks:  Goal:* Patient will be compliant with initial home exercise program to take an active role in their rehabilitation process. Status at [facility]: Patient was provided with a basic home exercise program including shoulder and hand AROM.   8/2/2022: initiated tendon glide HEP.   8/9/2022 -Pt reports compliance daily, goal met     Goal:* Patient will demonstrate a good understanding of their condition and strategies for self-management. Status at [facility]: pt educated on  prognosis, diagnosis, activity modifications, treatment plan, precautions, edema mgmt  8/2/2022: reviewed edema management strategies and use of heat modalities. 8/16/2022 -pt compliant with HEP, edema mgmt strategies and compression garment wear. Goal met     Long Term Goals: To be accomplished in 4 weeks:           Goal:* Patient will have 70 degrees of right forearm supination in order to perform ADL's including washing face and accepting change. Status at [facility]: not assessed due to protocol from referring provider, see chart.     Status at PN 8/19/2022 -58 deg, progressing    Goal:* Patient will have 70 degrees of right forearm pronation in order to type and utilize computer mouse as well as bear wt thru the hand when pushing up from a chair. Status at Eval: not assessed due to protocol from referring provider, see chart    Status at PN 8/19/2022 -75 deg, goal met    Goal:* Patient will have 15 degrees of right elbow extension in order to increase ease with ADL's such as bathing, eating and dressing and to eventually bear weight thru the right upper extremity as in pushing up from a chair. Status at Eval: Status at Eval: not assessed due to protocol from referring provider, see chart    Status at PN 8/19/2022 -18 deg, progressing    Goal:* Patient will have 130 degrees of right elbow flexion in order to perform hygiene tasks and /or work with tools such as a screwdriver. Status at Eval:Status at Eval: not assessed due to protocol from referring provider, see chart    Status at  8/19/2022 -120 deg, progressing    Goal:* Patient will demonstrate a 0.4 cm decrease in edema of the right wrist and distal palmar crease and the elbow in order to improve use of right UE for grooming, bathing and dressing tasks. Status at Eval: [facility] 23.5, wrist 20.2, elbow 34.3 cm   8/9/2022 -D[facility] 22.4, wrist 19.7, elbow 31.7, goal met for this treatment session    Status at  8/19/2022 -[facility] 21.8, wrist 19.7, elbow 30.4 goal met    Goal:* Patient will show a 20 point improvement on FOTO functional status measure to improve overall functional performance.   Status at eval: 26   Status at  8/19/2022 -12, regressed    PLAN  []  Upgrade activities as tolerated     [x]  Continue plan of care  []  Update interventions per flow sheet       []  Discharge due to:_  []  Other:_      Kevin Rose OT 8/19/2022  3:33 PM    Future Appointments   Date Time Provider Boyd Martinez   8/23/2022  3:15 PM Brea Todd OT [facility]

## 2022-08-19 NOTE — PROGRESS NOTES
[facility]  [address] [place] [address],[address] [facility], [facility] [address]  [phone] fax    Occupational Therapy Progress Note  Patient name: Riddhi Sanabria Start of Care: 2022   Referral source: Kenan Fowler* : 1960   Medical/Treatment Diagnosis: Pain in right elbow [M25.521]  Payor: [employer] / Plan: [employer]ar[employer] HMO / Product Type: HMO /  Onset Date:2022     Prior Hospitalization: see medical history Provider#: [id number]   Medications: Verified on Patient Summary List    Comorbidities: Heart disease, HTN   Prior Level of Function:  (I) with ADL/IADL tasks without functional limitations and pain using right UE, walking, biking, cooking      Visits from Start of Care: 7    Missed Visits: 0    Established Goals:         Excellent           Good         Limited           None  [x] Increased ROM   []  []  [x]  []  [] Increased Strength  []  []  []  []  [] Increased Mobility  []  []  []  []   [x] Decreased Pain   []  [x]  []  []  [x] Decreased Swelling  []  [x]  []  []  [x] Increased Fine Motor Skills []  [x]  []  []  [x] Increased ADL [place] []  [x]  []  []    Key Functional Changes: reduced right UE edema, improving right UE AROM  Updated Goals: to be achieved in 4 weeks:         Goal:* Patient will have 70 degrees of right forearm supination in order to perform ADL's including washing face and accepting change. Status at PN 2022 -58 deg    Goal:* Patient will have 15 degrees of right elbow extension in order to increase ease with ADL's such as bathing, eating and dressing and to eventually bear weight thru the right upper extremity as in pushing up from a chair. Status at PN 2022 -18 deg     Goal:* Patient will have 130 degrees of right elbow flexion in order to perform hygiene tasks and /or work with tools such as a screwdriver.   Status at PN 2022 -120 deg    Goal:* Patient will show a 20 point improvement on FOTO functional status measure to improve overall functional performance. Status at eval: 26  Status at PN 8/19/2022 -12    ASSESSMENT/RECOMMENDATIONS:  [x]Continue therapy per initial plan/protocol at a frequency of  2 x per week for 4 weeks  []Continue therapy with the following recommended changes:_____________________      _____________________________________________________________________  []Discontinue therapy progressing towards or have reached established goals  []Discontinue therapy due to lack of appreciable progress towards goals  []Discontinue therapy due to lack of attendance or compliance  []Await Physician's recommendations/decisions regarding therapy  []Other:________________________________________________________________    Thank you for this referral.   Jer Curtis OT 8/19/2022 4:22 PM  NOTE TO PHYSICIAN:  PLEASE COMPLETE THE ORDERS BELOW AND   FAX TO [facility]: [phone]  If you are unable to process this request in 24 hours please contact our office: [phone]    I have read the above report and request that my patient continue as recommended. I have read the above report and request that my patient continue therapy with the following changes/special instructions:__________________________________________________________  I have read the above report and request that my patient be discharged from therapy.     [de-identified] Signature:____________Date:_________TIME:________     Munday Ast*  ** Signature, Date and Time must be completed for valid certification **

## 2022-08-23 ENCOUNTER — HOSPITAL ENCOUNTER (OUTPATIENT)
Dept: PHYSICAL THERAPY | Age: 62
Discharge: HOME | End: 2022-08-23
Payer: COMMERCIAL

## 2022-08-23 PROCEDURE — 97535 SELF CARE MNGMENT TRAINING: CPT

## 2022-08-23 PROCEDURE — 97110 THERAPEUTIC EXERCISES: CPT

## 2022-08-23 PROCEDURE — 97140 MANUAL THERAPY 1/> REGIONS: CPT

## 2022-08-23 NOTE — PROGRESS NOTES
OT DAILY TREATMENT NOTE     Patient Name: Donis Kawasaki  Date:2022  : 1960  [x]  Patient  Verified  Payor: Andrés Ground / Plan: [employer] Riveraick HMO / Product Type: HMO /    In time:3:18  Out time:4:02  Total Treatment Time (min): 44  Visit #: 1 of 8    Treatment Area: Pain in right elbow [M25.521]    SUBJECTIVE  Pain Level (0-10 scale): 0/10  Any medication changes, allergies to medications, adverse drug reactions, diagnosis change, or new procedure performed?: [x] No    [] Yes (see summary sheet for update)  Subjective functional status/changes:   [] No changes reported    \"I have been better about not using my right arm for too much\"   \" My fingers are swollen and stiff\"     OBJECTIVE    Modality rationale: decrease pain and increase tissue extensibility to improve the patients ability to move right UE   Min Type Additional Details      [] Estim:  []Unatt       []IFC  []Premod                        []Other:  []w/ice   []w/heat  Position:  Location:      [] Estim: []Att    []TENS instruct  []NMES                    []Other:  []w/US   []w/ice   []w/heat  Position:  Location:      []  Traction: [] Cervical       []Lumbar                       [] Prone          []Supine                       []Intermittent   []Continuous Lbs:  [] before manual  [] after manual      []  Ultrasound: []Continuous   [] Pulsed                           []1MHz   []3MHz W/cm2:  Location:      []  Iontophoresis with dexamethasone         Location: [] Take home patch   [] In clinic    5 concurrent with self care []  Ice     [x]  Heat MHP  []  Ice massage  []  Laser   []  Paraffin Position:seated, resting  Location: right elbow, right wrist      []  Laser with stim  []  Other:  Position:  Location:      []  Vasopneumatic Device    []  Right     []  Left  Pre-treatment girth:  Post-treatment girth:  Measured at (location):  Pressure:       [] lo [] med [] hi   Temperature: [] lo [] med [] hi          [x] Skin assessment post-treatment:  [x]intact [x]redness- no adverse reaction    []redness - adverse reaction:      14 min Therapeutic Exercise:  [] See flow sheet :   Rationale:  increase ROM and improve coordination to improve the patients ability to move right UE and manipulate items using right hand    Right UE:     Towel scrunches   Tendon glides   Elbow flexion - Left hand supported   Small Dexterity balls- pronation tabletop       15 min Manual Therapy:  IASTM using tools #4, 6 using sweeping and fanning techniques    The manual therapy interventions were performed at a separate and distinct time from the therapeutic activities interventions. Rationale: increase tissue extensibility and decrease edema  to left hand, wrist and forearm    15 min Self Care/Home Management: protocol, precautions, treatment plan, edema management. Rationale:  patient education   to improve the patients ability to self-manage symptoms and improve functional use of right UE. With   [] TE   [] TA   [] neuro   [] other: Patient Education: [x] Review HEP    [x] Progressed/Changed HEP based on: independent with current HEP's.   [] positioning   [] body mechanics   [] transfers   [] heat/ice application   [] Splint wear/care   [] Sensory re-education   [] scar management      [] other:            Other Objective/Functional Measures:     Pt tolerated all digit ROM and elbow ROM without pain     Pain Level (0-10 scale) post treatment: 0/10    ASSESSMENT/Changes in Function: decreased stiffness with heat and massage modalities, pt compliant with protocol, pt able to complete all digit and elbow ROM exercises without increased pain. Patient will continue to benefit from skilled OT services to address strength deficits, analyze and address soft tissue restrictions, analyze and cue movement patterns, and analyze and modify body mechanics/ergonomics to attain remaining goals.      [x]  See Plan of Care  []  See progress note/recertification  []  See Discharge Summary         Progress towards goals / Updated goals:     Goal:* Patient will have 70 degrees of right forearm supination in order to perform ADL's including washing face and accepting change. Status at PN 8/19/2022 -58 deg     Goal:* Patient will have 15 degrees of right elbow extension in order to increase ease with ADL's such as bathing, eating and dressing and to eventually bear weight thru the right upper extremity as in pushing up from a chair. Status at PN 8/19/2022 -18 deg     Goal:* Patient will have 130 degrees of right elbow flexion in order to perform hygiene tasks and /or work with tools such as a screwdriver. Status at PN 8/19/2022 -120 deg     Goal:* Patient will show a 20 point improvement on FOTO functional status measure to improve overall functional performance.   Status at eval: 26  Status at PN 8/19/2022 -12    PLAN  []  Upgrade activities as tolerated     [x]  Continue plan of care  []  Update interventions per flow sheet       []  Discharge due to:_  []  Other:_      Geryl Councilman, OTA 8/23/2022  10:39 AM    Future Appointments   Date Time Provider Boyd Martinez   8/23/2022  3:15 PM KANG Scanlon [facility]   8/26/2022  4:00 PM KANG Scanlon [facility]   8/30/2022  7:45 AM KANG Scanlon [facility]   9/2/2022  4:00 PM AKNG Scanlon [facility]   9/6/2022  7:45 AM KANG Scanlon [facility]   9/9/2022  4:00 PM KANG Scanlon [facility]   9/13/2022  7:45 AM KANG Scanlon [facility]   9/16/2022  3:15 PM Danica Talamantes OT [facility]

## 2022-08-24 ENCOUNTER — TELEPHONE (OUTPATIENT)
Dept: PHYSICAL THERAPY | Age: 62
End: 2022-08-24

## 2022-08-26 ENCOUNTER — HOSPITAL ENCOUNTER (OUTPATIENT)
Dept: PHYSICAL THERAPY | Age: 62
Discharge: HOME | End: 2022-08-26
Payer: COMMERCIAL

## 2022-08-26 PROCEDURE — 97110 THERAPEUTIC EXERCISES: CPT

## 2022-08-26 PROCEDURE — 97140 MANUAL THERAPY 1/> REGIONS: CPT

## 2022-08-26 PROCEDURE — 97535 SELF CARE MNGMENT TRAINING: CPT

## 2022-08-26 NOTE — PROGRESS NOTES
OT DAILY TREATMENT NOTE     Patient Name: Jerry Correia  Date:2022  : 1960  [x]  Patient  Verified  Payor: [employer] [place] / Plan: [employer] / Product Type: HMO /    In time:1:46  Out time:2:39  Total Treatment Time (min): 48  Visit #: 2 of 8      Treatment Area: Pain in right elbow [M25.521]    SUBJECTIVE  Pain Level (0-10 scale): 0/10  Any medication changes, allergies to medications, adverse drug reactions, diagnosis change, or new procedure performed?: [x] No    [] Yes (see summary sheet for update)  Subjective functional status/changes:   [] No changes reported    \"Today is the day to adjust the brace\"   \"No pain\"     OBJECTIVE    Modality rationale: decrease pain and increase tissue extensibility to improve the patients ability to move right UE   Min Type Additional Details       [] Estim:  []Unatt       []IFC  []Premod                        []Other:  []w/ice   []w/heat  Position:  Location:       [] Estim: []Att    []TENS instruct  []NMES                    []Other:  []w/US   []w/ice   []w/heat  Position:  Location:       []  Traction: [] Cervical       []Lumbar                       [] Prone          []Supine                       []Intermittent   []Continuous Lbs:  [] before manual  [] after manual       []  Ultrasound: []Continuous   [] Pulsed                           []1MHz   []3MHz W/cm2:  Location:       []  Iontophoresis with dexamethasone         Location: [] Take home patch   [] In clinic     5 concurrent with self care []  Ice     [x]  Heat MHP  []  Ice massage  []  Laser   []  Paraffin Position:seated, resting  Location: right elbow, right wrist       []  Laser with stim  []  Other:  Position:  Location:       []  Vasopneumatic Device    []  Right     []  Left  Pre-treatment girth:  Post-treatment girth:  Measured at (location):  Pressure:       [] lo [] med [] hi   Temperature: [] lo [] med [] hi           [x] Skin assessment post-treatment:  [x]intact [x]redness- no adverse reaction    []redness - adverse reaction:      28 min Therapeutic Exercise:  [] See flow sheet :   Rationale: increase ROM and improve coordination to improve the patients ability to move right UE and manipulate items using right hand     Right UE:     Wrist AROM flex/ext   Shrugs/ shoulder Rolls   AAROM elbow flexion and extension , shoulder flexion      15 min Manual Therapy:  IASTM using tools #4, 6 using sweeping and fanning techniques    The manual therapy interventions were performed at a separate and distinct time from the therapeutic activities interventions. Rationale: increase tissue extensibility and decrease edema  to left hand, wrist and forearm      10 min Self Care/Home Management: protocol, treatment plan progression. Rationale:  patient education   to improve the patients ability to self-manage symptoms and improve functional use of right UE for daily tasks. With   [] TE   [] TA   [] neuro   [] other: Patient Education: [x] Review HEP    [x] Progressed/Changed HEP based on: initiated active wrist ROM HEP , initiated AAROM shoulder flexion HEP. [] positioning   [] body mechanics   [] transfers   [] heat/ice application   [] Splint wear/care   [] Sensory re-education   [] scar management      [] other:            Other Objective/Functional Measures:     Pt tolerated all AROM and AAROM exercises     Pain Level (0-10 scale) post treatment: 0/10    ASSESSMENT/Changes in Function: no increased pain with ROM and AAROM exercises, elbow brace was adjusted to active elbow extension and flexion to 0 deg, pt is progressing with AROM/AAROM exercises , pt continues to follow protocol. Reviewed treatment plan progression with patient. Pt is out of town for 1 week but will resume treatment sessions the following week.      Patient will continue to benefit from skilled OT services to address ROM deficits, address strength deficits, analyze and address soft tissue restrictions, analyze and cue movement patterns, and analyze and modify body mechanics/ergonomics to attain remaining goals. [x]  See Plan of Care  []  See progress note/recertification  []  See Discharge Summary         Progress towards goals / Updated goals:    Goal:* Patient will have 70 degrees of right forearm supination in order to perform ADL's including washing face and accepting change. Status at PN 8/19/2022 -58 deg     Goal:* Patient will have 15 degrees of right elbow extension in order to increase ease with ADL's such as bathing, eating and dressing and to eventually bear weight thru the right upper extremity as in pushing up from a chair. Status at PN 8/19/2022 -18 deg     Goal:* Patient will have 130 degrees of right elbow flexion in order to perform hygiene tasks and /or work with tools such as a screwdriver. Status at PN 8/19/2022 -120 deg     Goal:* Patient will show a 20 point improvement on FOTO functional status measure to improve overall functional performance.   Status at eval: 26  Status at PN 8/19/2022 -12    PLAN  []  Upgrade activities as tolerated     [x]  Continue plan of care  []  Update interventions per flow sheet       []  Discharge due to:_  []  Other:_      KANG Haines 8/26/2022  12:14 PM    Future Appointments   Date Time Provider Boyd Martinez   8/26/2022  1:45 PM Cristi Sit, KANG [facility]   8/30/2022  7:45 AM Cristi Sit, KANG [facility]   9/2/2022  4:00 PM Cristi Sit, KANG [facility]   9/6/2022  7:45 AM Cristi Sit, KANG [facility]   9/9/2022  4:00 PM Cristi Sit, KANG [facility]   9/13/2022  7:45 AM Cristi Sit, KANG [facility]   9/16/2022  3:15 PM Bri Infante OT [facility]

## 2022-08-30 ENCOUNTER — APPOINTMENT (OUTPATIENT)
Dept: PHYSICAL THERAPY | Age: 62
End: 2022-08-30
Payer: COMMERCIAL

## 2022-09-02 ENCOUNTER — APPOINTMENT (OUTPATIENT)
Dept: PHYSICAL THERAPY | Age: 62
End: 2022-09-02
Payer: COMMERCIAL

## 2022-09-06 ENCOUNTER — HOSPITAL ENCOUNTER (OUTPATIENT)
Dept: PHYSICAL THERAPY | Age: 62
Discharge: HOME | End: 2022-09-06
Payer: COMMERCIAL

## 2022-09-06 PROCEDURE — 97535 SELF CARE MNGMENT TRAINING: CPT

## 2022-09-06 PROCEDURE — 97110 THERAPEUTIC EXERCISES: CPT

## 2022-09-06 PROCEDURE — 97140 MANUAL THERAPY 1/> REGIONS: CPT

## 2022-09-06 NOTE — PROGRESS NOTES
OT DAILY TREATMENT NOTE     Patient Name: Reny Robert  Date:2022  : 1960  [x]  Patient  Verified  Payor: Barbi Lipoma / Plan: [employer] [place] [employer] / Product Type: HMO /    In time:7:48  Out time:8:33  Total Treatment Time (min): 45  Visit #: 3 of 8      Treatment Area: Pain in right elbow [M25.521]    SUBJECTIVE  Pain Level (0-10 scale): 1/10  Any medication changes, allergies to medications, adverse drug reactions, diagnosis change, or new procedure performed?: [x] No    [] Yes (see summary sheet for update)  Subjective functional status/changes:   [] No changes reported    \"Doing okay\"  \"The stiffness is in my hand and wrist\"    OBJECTIVE           Modality rationale: decrease pain and increase tissue extensibility to improve the patients ability to move right UE   Min Type Additional Details       [] Estim:  []Unatt       []IFC  []Premod                        []Other:  []w/ice   []w/heat  Position:  Location:       [] Estim: []Att    []TENS instruct  []NMES                    []Other:  []w/US   []w/ice   []w/heat  Position:  Location:       []  Traction: [] Cervical       []Lumbar                       [] Prone          []Supine                       []Intermittent   []Continuous Lbs:  [] before manual  [] after manual       []  Ultrasound: []Continuous   [] Pulsed                           []1MHz   []3MHz W/cm2:  Location:       []  Iontophoresis with dexamethasone         Location: [] Take home patch   [] In clinic     5 concurrent with self care []  Ice     [x]  Heat MHP  []  Ice massage  []  Laser   []  Paraffin Position:seated, resting  Location: right elbow, right wrist       []  Laser with stim  []  Other:  Position:  Location:       []  Vasopneumatic Device    []  Right     []  Left  Pre-treatment girth:  Post-treatment girth:  Measured at (location):  Pressure:       [] lo [] med [] hi   Temperature: [] lo [] med [] hi           [x] Skin assessment post-treatment:  [x]intact [x]redness- no adverse reaction    []redness - adverse reaction:      20 min Therapeutic Exercise:  [] See flow sheet :   Rationale: increase ROM and improve coordination to improve the patients ability to move right UE and manipulate items using right hand     Right UE:      Wrist AROM flex/ext   Shrugs/ shoulder Rolls   AAROM elbow flexion and extension , shoulder flexion   Digit blocking - PIP and DIP jt's. 15 min Manual Therapy:  IASTM using tools #4, 6 using sweeping and fanning techniques    The manual therapy interventions were performed at a separate and distinct time from the therapeutic activities interventions. Rationale: increase tissue extensibility and decrease edema  to left hand, wrist and forearm    10 min Self Care/Home Management: protocol, precautions , treatment plan progression. Rationale:  patient education  to improve the patients ability to self-manage symptoms and improve functional use of right UE for daily tasks. With   [] TE   [] TA   [] neuro   [] other: Patient Education: [x] Review HEP    [x] Progressed/Changed HEP based on: initiated digit blocking exercises. [] positioning   [] body mechanics   [] transfers   [] heat/ice application   [] Splint wear/care   [] Sensory re-education   [] scar management      [] other:            Other Objective/Functional Measures:     Pt tolerated all ROM exercises without difficulty. Edema present in digits and forearm    Pain Level (0-10 scale) post treatment: 0/10    ASSESSMENT/Changes in Function: decreased stiffness with heat and massage modalities, no increase in pain with ROM exercises, initiated additional digit ROM HEP to reduce stiffness in hand. progress treatments per protocol.      Patient will continue to benefit from skilled OT services to address ROM deficits, address strength deficits, analyze and address soft tissue restrictions, analyze and cue movement patterns, and analyze and modify body mechanics/ergonomics to attain remaining goals. [x]  See Plan of Care  []  See progress note/recertification  []  See Discharge Summary         Progress towards goals / Updated goals:    Goal:* Patient will have 70 degrees of right forearm supination in order to perform ADL's including washing face and accepting change. Status at PN 8/19/2022 -58 deg     Goal:* Patient will have 15 degrees of right elbow extension in order to increase ease with ADL's such as bathing, eating and dressing and to eventually bear weight thru the right upper extremity as in pushing up from a chair. Status at PN 8/19/2022 -18 deg     Goal:* Patient will have 130 degrees of right elbow flexion in order to perform hygiene tasks and /or work with tools such as a screwdriver. Status at PN 8/19/2022 -120 deg     Goal:* Patient will show a 20 point improvement on FOTO functional status measure to improve overall functional performance.   Status at eval: 26  Status at PN 8/19/2022 -12    PLAN  []  Upgrade activities as tolerated     [x]  Continue plan of care  []  Update interventions per flow sheet       []  Discharge due to:_  []  Other:_      KANG Palencia 9/6/2022  8:17 AM    Future Appointments   Date Time Provider Boyd Martinez   9/9/2022  4:00 PM KANG Nair [facility]   9/13/2022  7:45 [facility]   9/16/2022  3:15 PM Karine Garcia OT [facility]

## 2022-09-09 ENCOUNTER — HOSPITAL ENCOUNTER (OUTPATIENT)
Dept: PHYSICAL THERAPY | Age: 62
Discharge: HOME | End: 2022-09-09
Payer: COMMERCIAL

## 2022-09-09 PROCEDURE — 97140 MANUAL THERAPY 1/> REGIONS: CPT

## 2022-09-09 PROCEDURE — 97535 SELF CARE MNGMENT TRAINING: CPT

## 2022-09-09 PROCEDURE — 97110 THERAPEUTIC EXERCISES: CPT

## 2022-09-09 NOTE — PROGRESS NOTES
OT DAILY TREATMENT NOTE     Patient Name: Eufemia Mcgregor  Date:2022  : 1960  [x]  Patient  Verified  Payor: Karrie Varner / Plan: [employer] / Product Type: HMO /    In time:4:12  Out time:4:55  Total Treatment Time (min): 43  Visit #: 4 of 8      Treatment Area: Pain in right elbow [M25.521]    SUBJECTIVE  Pain Level (0-10 scale):  0/10  Any medication changes, allergies to medications, adverse drug reactions, diagnosis change, or new procedure performed?: [x] No    [] Yes (see summary sheet for update)  Subjective functional status/changes:   [] No changes reported    \" I tied my shoes today\"   \"I feel better with the brace off\"     OBJECTIVE               Modality rationale: decrease pain and increase tissue extensibility to improve the patients ability to move right UE   Min Type Additional Details       [] Estim:  []Unatt       []IFC  []Premod                        []Other:  []w/ice   []w/heat  Position:  Location:       [] Estim: []Att    []TENS instruct  []NMES                    []Other:  []w/US   []w/ice   []w/heat  Position:  Location:       []  Traction: [] Cervical       []Lumbar                       [] Prone          []Supine                       []Intermittent   []Continuous Lbs:  [] before manual  [] after manual       []  Ultrasound: []Continuous   [] Pulsed                           []1MHz   []3MHz W/cm2:  Location:       []  Iontophoresis with dexamethasone         Location: [] Take home patch   [] In clinic     5 concurrent with self care []  Ice     [x]  Heat MHP  []  Ice massage  []  Laser   []  Paraffin Position:seated, resting  Location: right elbow, right wrist       []  Laser with stim  []  Other:  Position:  Location:       []  Vasopneumatic Device    []  Right     []  Left  Pre-treatment girth:  Post-treatment girth:  Measured at (location):  Pressure:       [] lo [] med [] hi   Temperature: [] lo [] med [] hi           [x] Skin assessment post-treatment:  [x]intact [x]redness- no adverse reaction    []redness - adverse reaction:      18 min Therapeutic Exercise:  [] See flow sheet :   Rationale: increase ROM to improve the patients ability to move right UE for functional daily tasks. Right UE:     Ball rolls   Towel slides- elbow flex/ext tabletop   Towel slides on wall   Abd/add with marbles   Card fanning thumb ROM       15 min Manual Therapy:  IASTM using tools #4, 6 using sweeping and fanning techniques    The manual therapy interventions were performed at a separate and distinct time from the therapeutic activities interventions. Rationale: increase tissue extensibility and decrease edema  to left hand, wrist and forearm      10 min Self Care/Home Management: treatment plan, edema management, heat modalities    Rationale:  patient education   to improve the patients ability to self-manage symptoms and improve functional use of right UE for daily tasks. With   [] TE   [] TA   [] neuro   [] other: Patient Education: [] Review HEP    [] Progressed/Changed HEP based on:   [] positioning   [] body mechanics   [] transfers   [] heat/ice application   [] Splint wear/care   [] Sensory re-education   [] scar management      [] other:            Other Objective/Functional Measures:     Pt tolerated all elbow AROM well. Pain Level (0-10 scale) post treatment: 0/10    ASSESSMENT/Changes in Function: edema remains present in hand, wrist, and forearm, initiated elbow AROM, pt tolerated all AROM exercises well, no increase in pain with progression, progress per protocol. Patient will continue to benefit from skilled OT services to address ROM deficits, address strength deficits, analyze and address soft tissue restrictions, analyze and cue movement patterns, and analyze and modify body mechanics/ergonomics to attain remaining goals.      [x]  See Plan of Care  []  See progress note/recertification  []  See Discharge Summary         Progress towards goals / Updated goals:    Goal:* Patient will have 70 degrees of right forearm supination in order to perform ADL's including washing face and accepting change. Status at PN 8/19/2022 -58 deg     Goal:* Patient will have 15 degrees of right elbow extension in order to increase ease with ADL's such as bathing, eating and dressing and to eventually bear weight thru the right upper extremity as in pushing up from a chair. Status at PN 8/19/2022 -18 deg     Goal:* Patient will have 130 degrees of right elbow flexion in order to perform hygiene tasks and /or work with tools such as a screwdriver. Status at PN 8/19/2022 -120 deg     Goal:* Patient will show a 20 point improvement on FOTO functional status measure to improve overall functional performance.   Status at eval: 26  Status at PN 8/19/2022 -12    PLAN  []  Upgrade activities as tolerated     [x]  Continue plan of care  []  Update interventions per flow sheet       []  Discharge due to:_  []  Other:_      KANG Bolden 9/9/2022  11:57 AM    Future Appointments   Date Time Provider Boyd Martinez   9/9/2022  4:00 PM [facility]   [facility]   9/16/2022  3:15 PM [facility]

## 2022-09-13 ENCOUNTER — HOSPITAL ENCOUNTER (OUTPATIENT)
Dept: PHYSICAL THERAPY | Age: 62
Discharge: HOME | End: 2022-09-13
Payer: COMMERCIAL

## 2022-09-13 PROCEDURE — 97140 MANUAL THERAPY 1/> REGIONS: CPT

## 2022-09-13 PROCEDURE — 97110 THERAPEUTIC EXERCISES: CPT

## 2022-09-13 PROCEDURE — 97535 SELF CARE MNGMENT TRAINING: CPT

## 2022-09-13 NOTE — PROGRESS NOTES
OT DAILY TREATMENT NOTE     Patient Name: Jeana Seth  Date:2022  : 1960  [x]  Patient  Verified  Payor: Trevor [place] / Plan: [employer] [place] [employer] / Product Type: HMO /    In time:7:51  Out time:8:33  Total Treatment Time (min): 42  Visit #: 5 of 8    Treatment Area: Pain in right elbow [M25.521]    SUBJECTIVE  Pain Level (0-10 scale): 1/10  Any medication changes, allergies to medications, adverse drug reactions, diagnosis change, or new procedure performed?: [x] No    [] Yes (see summary sheet for update)  Subjective functional status/changes:   [] No changes reported    \" A little pain, I think I slept on my arm funny\"  \" It is going to different not having my brace on as a reminder\"     OBJECTIVE    Modality rationale: decrease pain and increase tissue extensibility to improve the patients ability to move right UE   Min Type Additional Details       [] Estim:  []Unatt       []IFC  []Premod                        []Other:  []w/ice   []w/heat  Position:  Location:       [] Estim: []Att    []TENS instruct  []NMES                    []Other:  []w/US   []w/ice   []w/heat  Position:  Location:       []  Traction: [] Cervical       []Lumbar                       [] Prone          []Supine                       []Intermittent   []Continuous Lbs:  [] before manual  [] after manual       []  Ultrasound: []Continuous   [] Pulsed                           []1MHz   []3MHz W/cm2:  Location:       []  Iontophoresis with dexamethasone         Location: [] Take home patch   [] In clinic     5 concurrent with self care []  Ice     [x]  Heat MHP  []  Ice massage  []  Laser   []  Paraffin Position:seated, resting  Location: right elbow, right wrist       []  Laser with stim  []  Other:  Position:  Location:       []  Vasopneumatic Device    []  Right     []  Left  Pre-treatment girth:  Post-treatment girth:  Measured at (location):  Pressure:       [] lo [] med [] hi   Temperature: [] lo [] med [] hi           [x] Skin assessment post-treatment:  [x]intact [x]redness- no adverse reaction    []redness - adverse reaction:      22 min Therapeutic Exercise:  [] See flow sheet :   Rationale: increase ROM to improve the patients ability to move right UE for functional daily tasks. Right UE:     Bruna Gross   Towel slides tabletop  Wall slides   Small dexterity balls in supination   Abd/add with marbles   Opposition and slide     12 min Manual Therapy:  IASTM using tools # 6 using sweeping techniques    The manual therapy interventions were performed at a separate and distinct time from the therapeutic activities interventions. Rationale: increase tissue extensibility and decrease edema  to left hand, wrist and forearm    8 min Self Care/Home Management: edema management, precautions, protocol. Rationale:  patient education   to improve the patients ability to self-manage symptoms and improve functional use of right hand. With   [] TE   [] TA   [] neuro   [] other: Patient Education: [] Review HEP    [] Progressed/Changed HEP based on:   [] positioning   [] body mechanics   [] transfers   [] heat/ice application   [] Splint wear/care   [] Sensory re-education   [] scar management      [] other:         Other Objective/Functional Measures:     Pt tolerated all right UE ROM well    Some difficulty performing sup/pro during tasks. Pain Level (0-10 scale) post treatment: 1/10    ASSESSMENT/Changes in Function: no increase in pain with ROM exercises, slight decrease in hand stiffness progress per protocol, decreased forearm ROM due to stiffness in forearm and elbow. Patient will continue to benefit from skilled OT services to address ROM deficits, address strength deficits, analyze and address soft tissue restrictions, analyze and cue movement patterns, and analyze and modify body mechanics/ergonomics to attain remaining goals.      [x]  See Plan of Care  []  See progress note/recertification  []  See Discharge Summary Progress towards goals / Updated goals:    Goal:* Patient will have 70 degrees of right forearm supination in order to perform ADL's including washing face and accepting change. Status at PN 8/19/2022 -58 deg     Goal:* Patient will have 15 degrees of right elbow extension in order to increase ease with ADL's such as bathing, eating and dressing and to eventually bear weight thru the right upper extremity as in pushing up from a chair. Status at PN 8/19/2022 -18 deg     Goal:* Patient will have 130 degrees of right elbow flexion in order to perform hygiene tasks and /or work with tools such as a screwdriver. Status at PN 8/19/2022 -120 deg     Goal:* Patient will show a 20 point improvement on FOTO functional status measure to improve overall functional performance.   Status at eval: 26  Status at PN 8/19/2022 -12     PLAN  []  Upgrade activities as tolerated     [x]  Continue plan of care  []  Update interventions per flow sheet       []  Discharge due to:_  []  Other:_      KANG Lopez 9/13/2022  7:50 AM    Future Appointments   Date Time Provider Boyd Martinez   9/16/2022  3:15 PM Josephine Villa OT [facility]   9/20/2022  7:45 AM Roselinda Ala, KANG [facility]   9/23/2022  4:00 PM Roselinda Ala, KANG [facility]   9/27/2022  7:45 AM Roselinda Ala, KANG [facility]   9/30/2022  4:00 PM Roselinda Ala, KANG [facility]   10/4/2022  7:45 AM Roselinda Ala, KANG [facility]   10/7/2022  4:00 PM Roselinda Ala, KANG [facility]

## 2022-09-16 ENCOUNTER — HOSPITAL ENCOUNTER (OUTPATIENT)
Dept: PHYSICAL THERAPY | Age: 62
Discharge: HOME | End: 2022-09-16
Payer: COMMERCIAL

## 2022-09-16 PROCEDURE — 97535 SELF CARE MNGMENT TRAINING: CPT

## 2022-09-16 PROCEDURE — 97530 THERAPEUTIC ACTIVITIES: CPT

## 2022-09-16 PROCEDURE — 97110 THERAPEUTIC EXERCISES: CPT

## 2022-09-16 NOTE — PROGRESS NOTES
[facility]  [address]  [place], [address]  [phone] fax    Occupational Therapy Progress Note  Patient name: Donis Kawasaki Start of Care: 2022   Referral source: German Salcedo* : 1960   Medical/Treatment Diagnosis: Pain in right elbow [M25.521]  Payor: Andrés Ground / Plan: Sydni Hooker HMO / Product Type: HMO /  Onset Date:2022     Prior Hospitalization: see medical history Provider#: [id number]   Medications: Verified on Patient Summary List    Comorbidities: Heart disease, HTN   Prior Level of Function:  (I) with ADL/IADL tasks without functional limitations and pain using right UE, walking, biking, cooking  Visits from Start of Care: 13    Missed Visits: 0    Established Goals:   Goal:* Patient will have 70 degrees of right forearm supination in order to perform ADL's including washing face and accepting change. Status at PN 2022 -58 deg   Status at PN 2022 - 67 deg, progressing     Goal:* Patient will have 15 degrees of right elbow extension in order to increase ease with ADL's such as bathing, eating and dressing and to eventually bear weight thru the right upper extremity as in pushing up from a chair. Status at PN 2022 -18 deg   Status at PN 2022 - 1 deg, goal met     Goal:* Patient will have 130 degrees of right elbow flexion in order to perform hygiene tasks and /or work with tools such as a screwdriver. Status at PN 2022 -120 deg   Status at PN 2022 - 122 deg, progressing     Goal:* Patient will show a 20 point improvement on FOTO functional status measure to improve overall functional performance.   Status at eval: 26  Status at PN 2022 -12  Status at PN 2022 - 35 (+9), progressing    Key Functional Changes: improved right UE AROM, reduced edema    Updated Goals: to be achieved in 4 weeks:  Goal:* Patient will have 70 degrees of right forearm supination in order to perform ADL's including washing face and accepting change. Status at PN 9/16/2022 - 67 deg    Goal:* Patient will have 130 degrees of right elbow flexion in order to perform hygiene tasks and /or work with tools such as a screwdriver. Status at PN 9/16/2022 - 122 deg    Goal:* Patient will have 75 degrees of right forearm supination in order to perform ADL's including washing face and accepting change. Status at PN 9/16/2022- 67 deg    Goal:* Pt will have 25 pounds of  in the right hand to allow for functional grasp for all ADL activities including dressing, bathing and self care. Status at PN 9/16/2022 - 12#    Goal:* Patient will show a 20 point improvement on FOTO functional status measure to improve overall functional performance. Status at eval: 26  Status at PN 9/16/2022 - 35 (+9)    ASSESSMENT/RECOMMENDATIONS:  [x]Continue therapy per initial plan/protocol at a frequency of  2 x per week for 4 weeks  []Continue therapy with the following recommended changes:_____________________      _____________________________________________________________________  []Discontinue therapy progressing towards or have reached established goals  []Discontinue therapy due to lack of appreciable progress towards goals  []Discontinue therapy due to lack of attendance or compliance  []Await Physician's recommendations/decisions regarding therapy  []Other:________________________________________________________________    Thank you for this referral.   Medardo Culver OT 9/16/2022 3:58 PM  NOTE TO PHYSICIAN:  PLEASE COMPLETE THE ORDERS BELOW AND   FAX TO [facility]: [phone]  If you are unable to process this request in 24 hours please contact our office: [phone]    I have read the above report and request that my patient continue as recommended.   I have read the above report and request that my patient continue therapy with the following changes/special instructions:__________________________________________________________  I have read the above report and request that my patient be discharged from therapy.     [facility] Signature:____________Date:_________TIME:________     Jestine Quiet*  ** Signature, Date and Time must be completed for valid certification **

## 2022-09-16 NOTE — PROGRESS NOTES
OT DAILY TREATMENT NOTE     Patient Name: Varun White  Date:2022  : 1960  [x]  Patient  Verified  Payor: Linda Rosado / Plan: Daniela Jose HMO / Product Type: HMO /    In time:2:39 PM  Out time:3:30 PM  Total Treatment Time (min): 51  Visit #: 6 of 8    Treatment Area: Pain in right elbow [M25.521]    SUBJECTIVE  Pain Level (0-10 scale): 0/10  Any medication changes, allergies to medications, adverse drug reactions, diagnosis change, or new procedure performed?: [x] No    [] Yes (see summary sheet for update)  Subjective functional status/changes:   [] No changes reported  \"I get the brace off today. \"    OBJECTIVE    Modality rationale: decrease pain and increase tissue extensibility to improve the patients ability to move right UE for functional tasks.     Min Type Additional Details    [] Estim:  []Unatt       []IFC  []Premod                        []Other:  []w/ice   []w/heat  Position:  Location:    [] Estim: []Att    []TENS instruct  []NMES                    []Other:  []w/US   []w/ice   []w/heat  Position:  Location:    []  Traction: [] Cervical       []Lumbar                       [] Prone          []Supine                       []Intermittent   []Continuous Lbs:  [] before manual  [] after manual    []  Ultrasound: []Continuous   [] Pulsed                           []1MHz   []3MHz W/cm2:  Location:    []  Iontophoresis with dexamethasone         Location: [] Take home patch   [] In clinic   5 []  Ice     [x]  Heat MHP  []  Ice massage  []  Laser   []  Paraffin Position: seated, resting  Location: right elbow and wrist    []  Laser with stim  []  Other:  Position:  Location:    []  Vasopneumatic Device    []  Right     []  Left  Pre-treatment girth:  Post-treatment girth:  Measured at (location):  Pressure:       [] lo [] med [] hi   Temperature: [] lo [] med [] hi       [x] Skin assessment post-treatment:  [x]intact [x]redness- no adverse reaction    []redness - adverse reaction:     21 min Therapeutic Exercise:  [x] See flow sheet :   Rationale: increase ROM and increase strength to improve the patients ability to move right UE for functional tasks and improve use for grooming, bathing and dressing tasks. 10 min Therapeutic Activity:  [x]  See flow sheet :   Rationale: increase strength  to improve the patients ability to grasp,  using right hand     15 min Self Care/Home Management: protocol review from referring provider, treatment plan, activity modifications, ADL education, precautions, splint wear    Rationale:  education   to improve the patients ability to promote healing of surgical site and improve functional use of right UE    With   [] TE   [] TA   [] neuro   [] other: Patient Education: [x] Review HEP    [] Progressed/Changed HEP based on:   [] positioning   [] body mechanics   [] transfers   [] heat/ice application   [] Splint wear/care   [] Sensory re-education   [] scar management      [] other:            Other Objective/Functional Measures:   Range of Motion:   Elbow/Wrist   Wrist 8/19/2022  Right 9/16/2022  Right   Flex 43 34   Ext 71 72   UD 32    RD 27    FA       Pro 75 85   Sup 58 67   Elbow      Flex 120 122   Ext 18 1      Edema: GIRTH CHART measured in cm  Date: 7/29/2022 8/9/2022 8/16/2022 8/19/2022 9/16/2022    Side Right/Left  Right Right Right right   DPC circum. 23.5/22.2 22.4  22.3 21.8 23.8   Wrist Crease 20.2/18.7 19.7  19.7 19.7 20.0   FA  31.7/29.9  30.3 28.7 29.1 29.3   Elbow 34.3/29.9          31.7          29.0        30.4 29.3                                Measurements: Taken with Frantz Dynamometer, in Lbs   Level 2 9/16/2022    Right 12   Left 53   Deficit    Change           Pain Level (0-10 scale) post treatment: 0/10    ASSESSMENT/Changes in Function: Pt presents to skilled OT today 9 weeks post op.   The protocol from the referring provider recommends to discontinue brace wear at this time therefore the dynamic elbow brace will be d/c'd at this time and pt verbalizes understanding of this. Pt demonstrates improved right wrist, FA, and elbow AROM with some mild limitations in wrist flexion, FA supination, and elbow flexion. Initiated PROM HEP for right wrist, FA and elbow and pt tolerated this without difficulty during this treatment session. Assessed  strength bilaterally with poor  strength noted in the right hand with 12# . Initiated shoulder isometric exercises and provided for HEP and pt reported no pain with this during this treatment session. Pt educated on protocol review from referring provider, treatment plan, activity modifications, ADL education, precautions, and d/c of brace. Will progress with PROM and strengthening activities to return to normal use of the right UE. Patient will continue to benefit from skilled OT services to address ROM deficits, address strength deficits, analyze and address soft tissue restrictions, analyze and cue movement patterns, and analyze and modify body mechanics/ergonomics to attain remaining goals. []  See Plan of Care  [x]  See progress note/recertification  []  See Discharge Summary         Progress towards goals / Updated goals:  Goal:* Patient will have 70 degrees of right forearm supination in order to perform ADL's including washing face and accepting change. Status at PN 8/19/2022 -58 deg   Status at PN 9/16/2022 - 67 deg, progressing    Goal:* Patient will have 15 degrees of right elbow extension in order to increase ease with ADL's such as bathing, eating and dressing and to eventually bear weight thru the right upper extremity as in pushing up from a chair. Status at PN 8/19/2022 -18 deg   Status at PN 9/16/2022 - 1 deg, goal met    Goal:* Patient will have 130 degrees of right elbow flexion in order to perform hygiene tasks and /or work with tools such as a screwdriver.   Status at PN 8/19/2022 -120 deg   Status at PN 9/16/2022 - 122 deg, progressing    Goal:* Patient will show a 20 point improvement on FOTO functional status measure to improve overall functional performance.   Status at eval: 26  Status at PN 8/19/2022 -12  Status at PN 9/16/2022 - 35 (+9), progressing    PLAN  []  Upgrade activities as tolerated     [x]  Continue plan of care  []  Update interventions per flow sheet       []  Discharge due to:_  []  Other:_      Javy Marcelo OT 9/16/2022  11:47 AM    Future Appointments   Date Time Provider Boyd Martinez   9/16/2022  2:30 PM Sagrario Castro OT [facility]   9/20/2022  7:45 AM Cherylann Shark, KANG [facility]   9/23/2022  4:00 PM Cherylann Shark, KANG [facility]   9/27/2022  7:45 AM Cherylann Shark, KANG [facility]   9/30/2022  4:00 PM Cherylann Shark, KANG [facility]   10/4/2022  7:45 AM Cherylann Shark, KANG [facility]   10/7/2022  4:00 PM Cherylann Shark, KANG [facility]

## 2022-09-20 ENCOUNTER — HOSPITAL ENCOUNTER (OUTPATIENT)
Dept: PHYSICAL THERAPY | Age: 62
Discharge: HOME | End: 2022-09-20
Payer: COMMERCIAL

## 2022-09-20 PROCEDURE — 97110 THERAPEUTIC EXERCISES: CPT

## 2022-09-20 PROCEDURE — 97535 SELF CARE MNGMENT TRAINING: CPT

## 2022-09-20 PROCEDURE — 97140 MANUAL THERAPY 1/> REGIONS: CPT

## 2022-09-20 NOTE — PROGRESS NOTES
OT DAILY TREATMENT NOTE     Patient Name: Shankar Hernandez  Date:2022  : 1960  [x]  Patient  Verified  Payor: Michael Galindo / Plan: Pipe Davidson HMO / Product Type: HMO /    In time:7:47  Out time:8:30  Total Treatment Time (min): 43  Visit #: 1 of 8    Treatment Area: Pain in right elbow [M25.521]    SUBJECTIVE  Pain Level (0-10 scale): 1/10  Any medication changes, allergies to medications, adverse drug reactions, diagnosis change, or new procedure performed?: [x] No    [] Yes (see summary sheet for update)  Subjective functional status/changes:   [] No changes reported    \"I can put my contacts in and my socks on now\"    OBJECTIVE    Modality rationale: decrease pain and increase tissue extensibility to improve the patients ability to move right UE for functional tasks.     Min Type Additional Details      [] Estim:  []Unatt       []IFC  []Premod                        []Other:  []w/ice   []w/heat  Position:  Location:      [] Estim: []Att    []TENS instruct  []NMES                    []Other:  []w/US   []w/ice   []w/heat  Position:  Location:      []  Traction: [] Cervical       []Lumbar                       [] Prone          []Supine                       []Intermittent   []Continuous Lbs:  [] before manual  [] after manual      []  Ultrasound: []Continuous   [] Pulsed                           []1MHz   []3MHz W/cm2:  Location:      []  Iontophoresis with dexamethasone         Location: [] Take home patch   [] In clinic    5 with sc  []  Ice     [x]  Heat MHP  []  Ice massage  []  Laser   []  Paraffin Position: seated, resting  Location: right elbow and wrist      []  Laser with stim  []  Other:  Position:  Location:      []  Vasopneumatic Device    []  Right     []  Left  Pre-treatment girth:  Post-treatment girth:  Measured at (location):  Pressure:       [] lo [] med [] hi   Temperature: [] lo [] med [] hi          [x] Skin assessment post-treatment:  [x]intact [x]redness- no adverse reaction []redness - adverse reaction:     25 min Therapeutic Exercise:  [] See flow sheet :   Rationale: increase ROM and increase strength to improve the patients ability to move right UE and  for functional daily tasks. Right UE:     Towel scrunches   Ball Rolls   Towel slides on wall   Shoulder Isometrics   Peg removal from soft yellow putty       10 min Manual Therapy:  IASTM #6 and #5 using sweeping techniques    The manual therapy interventions were performed at a separate and distinct time from the therapeutic activities interventions. Rationale: decrease pain, increase ROM, increase tissue extensibility, and decrease edema  to right digits, wrist, and forearm. 8 min Self Care/Home Management: treatment plan, protocol, precautions, edema management. Rationale:  patient education   to improve the patients ability to self-manage symptoms and improve functional use of right UE. With   [] TE   [] TA   [] neuro   [] other: Patient Education: [x] Review HEP    [x] Progressed/Changed HEP based on: independent with AROM/PROM and isometric HEP. [] positioning   [] body mechanics   [] transfers   [] heat/ice application   [] Splint wear/care   [] Sensory re-education   [] scar management      [] other:            Other Objective/Functional Measures:     Pt tolerated all AROM/PROM and strengthening well. Pain Level (0-10 scale) post treatment: 1/10    ASSESSMENT/Changes in Function: decreased stiffness with heat and massage modalities, no increase in pain with ROM and strengthening exercises, pt tolerated all ROM and strengthening well, progress per protocol. Patient will continue to benefit from skilled OT services to address ROM deficits, address strength deficits, analyze and address soft tissue restrictions, analyze and cue movement patterns, and analyze and modify body mechanics/ergonomics to attain remaining goals.      [x]  See Plan of Care  []  See progress note/recertification  []  See Discharge Summary         Progress towards goals / Updated goals:    Updated Goals: to be achieved in 4 weeks:  Goal:* Patient will have 70 degrees of right forearm supination in order to perform ADL's including washing face and accepting change. Status at PN 9/16/2022 - 67 deg     Goal:* Patient will have 130 degrees of right elbow flexion in order to perform hygiene tasks and /or work with tools such as a screwdriver. Status at PN 9/16/2022 - 122 deg     Goal:* Patient will have 75 degrees of right forearm supination in order to perform ADL's including washing face and accepting change. Status at PN 9/16/2022- 67 deg     Goal:* Pt will have 25 pounds of  in the right hand to allow for functional grasp for all ADL activities including dressing, bathing and self care. Status at PN 9/16/2022 - 12#     Goal:* Patient will show a 20 point improvement on FOTO functional status measure to improve overall functional performance.   Status at eval: 26  Status at PN 9/16/2022 - 35 (+9)    PLAN  []  Upgrade activities as tolerated     [x]  Continue plan of care  []  Update interventions per flow sheet       []  Discharge due to:_  []  Other:_      KANG Haines 9/20/2022  7:50 AM    Future Appointments   Date Time Provider Boyd Martinez   9/23/2022  4:00 PM KANG No [facility]   9/27/2022  7:45 AM KANG No [facility]   9/30/2022  4:00 PM KANG No [facility]   10/4/2022  7:45 AM KANG No [facility]   10/7/2022  4:00 PM Cristikaren Arauz KANG [facility]

## 2022-09-27 ENCOUNTER — HOSPITAL ENCOUNTER (OUTPATIENT)
Dept: PHYSICAL THERAPY | Age: 62
Discharge: HOME | End: 2022-09-27
Payer: COMMERCIAL

## 2022-09-27 PROCEDURE — 97535 SELF CARE MNGMENT TRAINING: CPT

## 2022-09-27 PROCEDURE — 97110 THERAPEUTIC EXERCISES: CPT

## 2022-09-27 NOTE — PROGRESS NOTES
OT DAILY TREATMENT NOTE     Patient Name: Portillo Charlton  Date:2022  : 1960  [x]  Patient  Verified  Payor: Darnell Witt / Plan: Esperanza Tamayo HMO / Product Type: HMO /    In time:7:47  Out time:8:30  Total Treatment Time (min): 43  Visit #: 2 of 8    Treatment Area: Pain in right elbow [M25.521]    SUBJECTIVE  Pain Level (0-10 scale): 10 shoulder   Any medication changes, allergies to medications, adverse drug reactions, diagnosis change, or new procedure performed?: [x] No    [] Yes (see summary sheet for update)  Subjective functional status/changes:   [] No changes reported    \"Shoulder is having pain\"   \"I ordered compression gloves\"  \"Have a doctor appointment \"     OBJECTIVE    Modality rationale: decrease pain and increase tissue extensibility to improve the patients ability to move right UE for functional tasks.     Min Type Additional Details       [] Estim:  []Unatt       []IFC  []Premod                        []Other:  []w/ice   []w/heat  Position:  Location:       [] Estim: []Att    []TENS instruct  []NMES                    []Other:  []w/US   []w/ice   []w/heat  Position:  Location:       []  Traction: [] Cervical       []Lumbar                       [] Prone          []Supine                       []Intermittent   []Continuous Lbs:  [] before manual  [] after manual       []  Ultrasound: []Continuous   [] Pulsed                           []1MHz   []3MHz W/cm2:  Location:       []  Iontophoresis with dexamethasone         Location: [] Take home patch   [] In clinic     5 with sc  []  Ice     [x]  Heat MHP  []  Ice massage  []  Laser   []  Paraffin Position: seated, resting  Location: right elbow and shoulder       []  Laser with stim  []  Other:  Position:  Location:       []  Vasopneumatic Device    []  Right     []  Left  Pre-treatment girth:  Post-treatment girth:  Measured at (location):  Pressure:       [] lo [] med [] hi   Temperature: [] lo [] med [] hi        [x] Skin assessment post-treatment:  [x]intact [x]redness- no adverse reaction    []redness - adverse reaction:     35 min Therapeutic Exercise:  [] See flow sheet :   Rationale: increase ROM and increase strength to improve the patients ability to move right UE and  for functional daily tasks. Right UE:     Evone Bridges   Towel slides on wall  Wrist, elbow, forearm PROM   Tennis ball squeeze with marbles   Red digiflex - individual   1# Dumbbell - biceps 3 ways, wrist flex/ext   1/4\" peg removal from soft yellow putty     8 min Self Care/Home Management: edema management, protocol, treatment plan, precautions. Rationale:  patient education   to improve the patients ability to self-manage symptoms and improve functional use of right UE for daily tasks. With   [] TE   [] TA   [] neuro   [] other: Patient Education: [x] Review HEP    [x] Progressed/Changed HEP based on: independent with AROM and PROM HEP's.   [] positioning   [] body mechanics   [] transfers   [] heat/ice application   [] Splint wear/care   [] Sensory re-education   [] scar management      [] other:            Other Objective/Functional Measures:     Pt tolerated all strengthening well  Reduced shoulder ROM due to increased discomfort. Pain Level (0-10 scale) post treatment: 0-1/10    ASSESSMENT/Changes in Function: pt tolerated all  PROM exercises without increased pain, initiated gentle wrist and elbow strengthening exercises, pt tolerated well, slight increase in pain in shoulder ROM exercises, progress per protocol. Patient will continue to benefit from skilled OT services to address ROM deficits, address strength deficits, analyze and address soft tissue restrictions, analyze and cue movement patterns, and analyze and modify body mechanics/ergonomics to attain remaining goals.      [x]  See Plan of Care  []  See progress note/recertification  []  See Discharge Summary         Progress towards goals / Updated goals:    Updated Goals: to be achieved in 4 weeks:  Goal:* Patient will have 70 degrees of right forearm supination in order to perform ADL's including washing face and accepting change. Status at PN 9/16/2022 - 67 deg     Goal:* Patient will have 130 degrees of right elbow flexion in order to perform hygiene tasks and /or work with tools such as a screwdriver. Status at PN 9/16/2022 - 122 deg     Goal:* Patient will have 75 degrees of right forearm supination in order to perform ADL's including washing face and accepting change. Status at PN 9/16/2022- 67 deg     Goal:* Pt will have 25 pounds of  in the right hand to allow for functional grasp for all ADL activities including dressing, bathing and self care. Status at PN 9/16/2022 - 12#     Goal:* Patient will show a 20 point improvement on FOTO functional status measure to improve overall functional performance.   Status at eval: 26  Status at PN 9/16/2022 - 35 (+9)       PLAN  []  Upgrade activities as tolerated     [x]  Continue plan of care  []  Update interventions per flow sheet       []  Discharge due to:_  []  Other:_      KANG Hernandez 9/27/2022  7:51 AM    Future Appointments   Date Time Provider Boyd Martinez   9/30/2022  4[facility]00 [facility]   10/4/2022  7:45 AM [facility]   10/7/2022  4:00 PM KANG Oliveira [facility]

## 2022-10-04 ENCOUNTER — HOSPITAL ENCOUNTER (OUTPATIENT)
Dept: PHYSICAL THERAPY | Age: 62
Discharge: HOME | End: 2022-10-04
Payer: COMMERCIAL

## 2022-10-04 PROCEDURE — 97535 SELF CARE MNGMENT TRAINING: CPT

## 2022-10-04 PROCEDURE — 97110 THERAPEUTIC EXERCISES: CPT

## 2022-10-04 NOTE — PROGRESS NOTES
OT DAILY TREATMENT NOTE     Patient Name: Aarti Galvin  Date:10/4/2022  : 1960  [x]  Patient  Verified  Payor: Daniel Los / Plan: [employer] / Product Type: HMO /    In time:7:50  Out time:8:36  Total Treatment Time (min): 55  Visit #: 3 of 8    Medicare/BCBS Only   Total Timed Codes (min):  46 1:1 Treatment Time:  46     Treatment Area: Pain in right elbow [M25.521]    SUBJECTIVE  Pain Level (0-10 scale): 0/10  Any medication changes, allergies to medications, adverse drug reactions, diagnosis change, or new procedure performed?: [x] No    [] Yes (see summary sheet for update)  Subjective functional status/changes:   [] No changes reported    \"My shoulder and arm have been sore\"  \"I want to know what I should or shouldn't be doing\"    OBJECTIVE    Modality rationale: decrease pain and increase tissue extensibility to improve the patients ability to move right UE for functional tasks.     Min Type Additional Details       [] Estim:  []Unatt       []IFC  []Premod                        []Other:  []w/ice   []w/heat  Position:  Location:       [] Estim: []Att    []TENS instruct  []NMES                    []Other:  []w/US   []w/ice   []w/heat  Position:  Location:       []  Traction: [] Cervical       []Lumbar                       [] Prone          []Supine                       []Intermittent   []Continuous Lbs:  [] before manual  [] after manual       []  Ultrasound: []Continuous   [] Pulsed                           []1MHz   []3MHz W/cm2:  Location:       []  Iontophoresis with dexamethasone         Location: [] Take home patch   [] In clinic     5 with sc  []  Ice     [x]  Heat MHP  []  Ice massage  []  Laser   []  Paraffin Position: seated, resting  Location: right elbow and shoulder       []  Laser with stim  []  Other:  Position:  Location:       []  Vasopneumatic Device    []  Right     []  Left  Pre-treatment girth:  Post-treatment girth:  Measured at (location):  Pressure:       [] lo [] med [] hi   Temperature: [] lo [] med [] hi        [x] Skin assessment post-treatment:  [x]intact [x]redness- no adverse reaction    []redness - adverse reaction:     28 min Therapeutic Exercise:  [] See flow sheet :   Rationale: increase ROM and increase strength to improve the patients ability to move , lift, and  for functional daily tasks. Right UE:     Towel scrunches   Ball Rolls   Wrist PROM - flex/ext   Wrist flex/ext with 2# dumbbell   Biceps 3 ways 1#   Standing wall push up's - 2 arms  Tennis ball squeeze with marbles   Small yellow tbar 3 ways      10 min Manual Therapy:  IASTM #6 using sweeping techniques    The manual therapy interventions were performed at a separate and distinct time from the therapeutic activities interventions. Rationale: decrease pain, increase ROM, increase tissue extensibility, and decrease edema  to right digits. 8 min Self Care/Home Management: strength progression, treatment plan, precautions. Rationale:  patient education   to improve the patients ability to self-manage symptoms and improve functional use of right UE. With   [] TE   [] TA   [] neuro   [] other: Patient Education: [x] Review HEP    [x] Progressed/Changed HEP based on: initiated wall push ups for HEP. [] positioning   [] body mechanics   [] transfers   [] heat/ice application   [] Splint wear/care   [] Sensory re-education   [] scar management      [] other:            Other Objective/Functional Measures:     Pt tolerated all ROM and gentle strengthening well. Pain Level (0-10 scale) post treatment: 0/10    ASSESSMENT/Changes in Function: increased repetitions for strengthening, initiated wall push ups and pt tolerated well, no increase in pain with ROM and strengthening exercises, progress per protocol and as tolerated.      Patient will continue to benefit from skilled OT services to address ROM deficits, address strength deficits, analyze and address soft tissue restrictions, and analyze and modify body mechanics/ergonomics to attain remaining goals. [x]  See Plan of Care  []  See progress note/recertification  []  See Discharge Summary         Progress towards goals / Updated goals:    Updated Goals: to be achieved in 4 weeks:  Goal:* Patient will have 70 degrees of right forearm supination in order to perform ADL's including washing face and accepting change. Status at PN 9/16/2022 - 67 deg     Goal:* Patient will have 130 degrees of right elbow flexion in order to perform hygiene tasks and /or work with tools such as a screwdriver. Status at PN 9/16/2022 - 122 deg     Goal:* Patient will have 75 degrees of right forearm supination in order to perform ADL's including washing face and accepting change. Status at PN 9/16/2022- 67 deg     Goal:* Pt will have 25 pounds of  in the right hand to allow for functional grasp for all ADL activities including dressing, bathing and self care. Status at PN 9/16/2022 - 12#     Goal:* Patient will show a 20 point improvement on FOTO functional status measure to improve overall functional performance.   Status at eval: 26  Status at PN 9/16/2022 - 35 (+9)       PLAN  []  Upgrade activities as tolerated     [x]  Continue plan of care  []  Update interventions per flow sheet       []  Discharge due to:_  []  Other:_      KANG Iverson 10/4/2022  7:54 AM    Future Appointments   Date Time Provider Boyd Martinez   10/7/2022  4:00 PM [facility]   10/11/2022  7:45 AM Henrene Flavors, KANG [facility]   10/14/2022  4:00 PM Henrene Flavors, KANG [facility]   10/18/2022  1:45 PM Henrene Flavors, KANG [facility]   10/21/2022  4:00 PM Henrene Flavors, KANG [facility]

## 2022-10-07 ENCOUNTER — HOSPITAL ENCOUNTER (OUTPATIENT)
Dept: PHYSICAL THERAPY | Age: 62
Discharge: HOME | End: 2022-10-07
Payer: COMMERCIAL

## 2022-10-07 PROCEDURE — 97110 THERAPEUTIC EXERCISES: CPT

## 2022-10-07 PROCEDURE — 97018 PARAFFIN BATH THERAPY: CPT

## 2022-10-07 NOTE — PROGRESS NOTES
OT DAILY TREATMENT NOTE     Patient Name: Airam Diane  Date:10/7/2022  : 1960  [x]  Patient  Verified  Payor: Deonna Erp / Plan: [employer] HMO / Product Type: HMO /    In time:4:06  Out time:4:52  Total Treatment Time (min): 46  Visit #: 4 of 8      Treatment Area: Pain in right elbow [M25.521]    SUBJECTIVE  Pain Level (0-10 scale): 0/10  Any medication changes, allergies to medications, adverse drug reactions, diagnosis change, or new procedure performed?: [x] No    [] Yes (see summary sheet for update)  Subjective functional status/changes:   [] No changes reported    \"I got hot pads for home\"   \"I want to know what I can do and not do\"    OBJECTIVE    Modality rationale: decrease pain and increase tissue extensibility to improve the patients ability to move right UE for functional tasks.     Min Type Additional Details       [] Estim:  []Unatt       []IFC  []Premod                        []Other:  []w/ice   []w/heat  Position:  Location:       [] Estim: []Att    []TENS instruct  []NMES                    []Other:  []w/US   []w/ice   []w/heat  Position:  Location:       []  Traction: [] Cervical       []Lumbar                       [] Prone          []Supine                       []Intermittent   []Continuous Lbs:  [] before manual  [] after manual       []  Ultrasound: []Continuous   [] Pulsed                           []1MHz   []3MHz W/cm2:  Location:       []  Iontophoresis with dexamethasone         Location: [] Take home patch   [] In clinic     10  []  Ice     [x]  Heat MHP  []  Ice massage  []  Laser   [x]  Paraffin Position: seated, resting  Location: right elbow and shoulder       []  Laser with stim  []  Other:  Position:  Location:       []  Vasopneumatic Device    []  Right     []  Left  Pre-treatment girth:  Post-treatment girth:  Measured at (location):  Pressure:       [] lo [] med [] hi   Temperature: [] lo [] med [] hi        [x] Skin assessment post-treatment:  [x]intact Patient was not available for the therapy session at this time.  Reason not seen: Other health personnel with patient(in dialysis) (10/05/19 1545).    Re-Attempt Plan: Will re-attempt tomorrow (10/05/19 1545).   [x]redness- no adverse reaction    []redness - adverse reaction:     36 min Therapeutic Exercise:  [] See flow sheet :   Rationale: increase ROM and increase strength to improve the patients ability to move right elbow and  for functional daily tasks. Right UE:     Jerrica Shiley   Towel slides on wall   Small hammer stretch   Shoulder Isometrics   2# wrist flex/ext, RD   2# biceps curls       With   [] TE   [] TA   [] neuro   [] other: Patient Education: [x] Review HEP    [x] Progressed/Changed HEP based on: independent with ROM/PROM and strengthening HEP. [] positioning   [] body mechanics   [] transfers   [] heat/ice application   [] Splint wear/care   [] Sensory re-education   [] scar management      [] other:            Other Objective/Functional Measures:     Pt tolerated all Right UE ROM, PROM, and strengthening well      Pain Level (0-10 scale) post treatment: 0/10    ASSESSMENT/Changes in Function: improving strength, improving ability to utilize right UE for functional daily tasks, no increase in pain with strengthening exercises, pt was educated to perform dumbbell exercises at home to continue to build strength, progress per protocol and tolerated. Patient will continue to benefit from skilled OT services to address ROM deficits, address strength deficits, analyze and address soft tissue restrictions, analyze and cue movement patterns, and analyze and modify body mechanics/ergonomics to attain remaining goals. [x]  See Plan of Care  []  See progress note/recertification  []  See Discharge Summary         Progress towards goals / Updated goals:    Updated Goals: to be achieved in 4 weeks:  Goal:* Patient will have 70 degrees of right forearm supination in order to perform ADL's including washing face and accepting change.    Status at PN 9/16/2022 - 67 deg     Goal:* Patient will have 130 degrees of right elbow flexion in order to perform hygiene tasks and /or work with tools such as a screwdriver. Status at PN 9/16/2022 - 122 deg     Goal:* Patient will have 75 degrees of right forearm supination in order to perform ADL's including washing face and accepting change. Status at PN 9/16/2022- 67 deg     Goal:* Pt will have 25 pounds of  in the right hand to allow for functional grasp for all ADL activities including dressing, bathing and self care. Status at PN 9/16/2022 - 12#     Goal:* Patient will show a 20 point improvement on FOTO functional status measure to improve overall functional performance.   Status at eval: 26  Status at PN 9/16/2022 - 35 (+9)       PLAN  []  Upgrade activities as tolerated     [x]  Continue plan of care  []  Update interventions per flow sheet       []  Discharge due to:_  []  Other:_      KANG De La Vega 10/7/2022  1:06 PM    Future Appointments   Date Time Provider Boyd Martinez   10/7/2022  4:00 PM KANG [facility]   10/11/2022  7:45 AM KNAG [facility]   10/14/2022  4:00 PM KANG [facility]   10/18/2022  1:45 PM KANG [facility]   10/21/2022  4:00 PM KANG [facility]

## 2022-10-11 ENCOUNTER — HOSPITAL ENCOUNTER (OUTPATIENT)
Dept: PHYSICAL THERAPY | Age: 62
Discharge: HOME | End: 2022-10-11
Payer: COMMERCIAL

## 2022-10-11 PROCEDURE — 97530 THERAPEUTIC ACTIVITIES: CPT

## 2022-10-11 PROCEDURE — 97535 SELF CARE MNGMENT TRAINING: CPT

## 2022-10-14 ENCOUNTER — HOSPITAL ENCOUNTER (OUTPATIENT)
Dept: PHYSICAL THERAPY | Age: 62
Discharge: HOME | End: 2022-10-14
Payer: COMMERCIAL

## 2022-10-14 PROCEDURE — 97110 THERAPEUTIC EXERCISES: CPT

## 2022-10-14 PROCEDURE — 97018 PARAFFIN BATH THERAPY: CPT

## 2022-10-14 NOTE — PROGRESS NOTES
OT DAILY TREATMENT NOTE     Patient Name: Jolly Gordon  Date:10/14/2022  : 1960  [x]  Patient  Verified  Payor: Travon Gomez / Plan: [facility] [employer] / Product Type: HMO /    In time:12:22  Out time:1:00  Total Treatment Time (min): 38  Visit #: 6 of 8    Treatment Area: Pain in right elbow [M25.521]    SUBJECTIVE  Pain Level (0-10 scale): 0/10  Any medication changes, allergies to medications, adverse drug reactions, diagnosis change, or new procedure performed?: [x] No    [] Yes (see summary sheet for update)  Subjective functional status/changes:   [] No changes reported    \"Saw  And he said everything was all good\"   \"Middle finger is really stiff still\"      OBJECTIVE    Modality rationale: decrease pain and increase tissue extensibility to improve the patients ability to move right UE for functional daily tasks.     Min Type Additional Details      [] Estim:  []Unatt       []IFC  []Premod                        []Other:  []w/ice   []w/heat  Position:  Location:      [] Estim: []Att    []TENS instruct  []NMES                    []Other:  []w/US   []w/ice   []w/heat  Position:  Location:      []  Traction: [] Cervical       []Lumbar                       [] Prone          []Supine                       []Intermittent   []Continuous Lbs:  [] before manual  [] after manual      []  Ultrasound: []Continuous   [] Pulsed                           []1MHz   []3MHz W/cm2:  Location:      []  Iontophoresis with dexamethasone         Location: [] Take home patch   [] In clinic    10 concurrent  []  Ice     [x]  Heat- MHP   []  Ice massage  []  Laser   [x]  Paraffin Position: seated/resting  Location: right elbow and wrist/forearm.       []  Laser with stim  []  Other:  Position:  Location:      []  Vasopneumatic Device    []  Right     []  Left  Pre-treatment girth:  Post-treatment girth:  Measured at (location):  Pressure:       [] lo [] med [] hi   Temperature: [] lo [] med [] hi       [x] Skin assessment post-treatment:  [x]intact []redness- no adverse reaction    []redness - adverse reaction:          28 min Therapeutic Exercise:  [] See flow sheet :   Rationale: increase ROM and increase strength to improve the patients ability to move right UE and  for functional daily tasks. Right UE:     Towel slides on wall   Wall push ups   Bicep curls 3# 3 ways   Large hammer stretch   Weightwell 2#   Translation with 1\" pegs - sets of 3   Peg removal from brown gripper 25#  Wrist maze       With   [] TE   [] TA   [] neuro   [] other: Patient Education: [] Review HEP    [] Progressed/Changed HEP based on:   [] positioning   [] body mechanics   [] transfers   [] heat/ice application   [] Splint wear/care   [] Sensory re-education   [] scar management      [] other:            Other Objective/Functional Measures:     Pt tolerated all strengthening well    Rest Breaks self initiated after dumbbell exercises    Pain Level (0-10 scale) post treatment: 0/10    ASSESSMENT/Changes in Function: slight increase in weight and repetitions with UE strengthening, pt tolerated all strengthening exercises without increased pain or discomfort, pt is progressing well, positive reports came from doctors appointment , progress as tolerated per protocol and doctors orders. Patient will continue to benefit from skilled OT services to address ROM deficits, address strength deficits, analyze and address soft tissue restrictions, analyze and cue movement patterns, and analyze and modify body mechanics/ergonomics to attain remaining goals. [x]  See Plan of Care  []  See progress note/recertification  []  See Discharge Summary         Progress towards goals / Updated goals:    Updated Goals: to be achieved in 4 weeks:  Goal:* Patient will have 70 degrees of right forearm supination in order to perform ADL's including washing face and accepting change.    Status at PN 9/16/2022 - 67 deg     Goal:* Patient will have 130 degrees of right elbow flexion in order to perform hygiene tasks and /or work with tools such as a screwdriver. Status at PN 9/16/2022 - 122 deg     Goal:* Pt will have 25 pounds of  in the right hand to allow for functional grasp for all ADL activities including dressing, bathing and self care. Status at PN 9/16/2022 - 12#     Goal:* Patient will show a 20 point improvement on FOTO functional status measure to improve overall functional performance.   Status at eval: 26  Status at PN 9/16/2022 - 35 (+9)       PLAN  []  Upgrade activities as tolerated     [x]  Continue plan of care  []  Update interventions per flow sheet       []  Discharge due to:_  []  Other:_      KANG Mayo 10/14/2022  10:41 AM    Future Appointments   Date Time Provider Boyd Martinez   10/14/2022 12:15 PM KANG [facility]   10/18/2022  1:45 PM KANG Stanton [facility]   10/21/2022  4:00 PM KANG Stanton [facility]

## 2022-10-18 ENCOUNTER — HOSPITAL ENCOUNTER (OUTPATIENT)
Dept: PHYSICAL THERAPY | Age: 62
Discharge: HOME | End: 2022-10-18
Payer: COMMERCIAL

## 2022-10-18 PROCEDURE — 97018 PARAFFIN BATH THERAPY: CPT

## 2022-10-18 PROCEDURE — 97110 THERAPEUTIC EXERCISES: CPT

## 2022-10-18 PROCEDURE — 97535 SELF CARE MNGMENT TRAINING: CPT

## 2022-10-18 NOTE — PROGRESS NOTES
[facility]  [address] [address], [facility] [address]  [phone] fax    Occupational Therapy Progress Note  Patient name: Jolly Gordon Start of Care: 2022   Referral source: Noemi Felder* : 1960   Medical/Treatment Diagnosis: Pain in right elbow [M25.521]  Payor: [employer] / Plan: [employer]y [employer]bert HMO / Product Type: HMO /  Onset Date:2022     Prior Hospitalization: see medical history Provider#: [id number]   Medications: Verified on Patient Summary List    Comorbidities: Heart disease, HTN  Prior Level of Function: (I) with ADL/IADL tasks without functional limitations and pain using right UE, walking, biking, cooking  Visits from Start of Care: 24    Missed Visits: 2    Established Goals:         Excellent           Good         Limited           None  [] Increased ROM   []  []  []  []  [x] Increased Strength  []  []  [x]  []  [] Increased Mobility  []  []  []  []   [] Decreased Pain   []  []  []  []  [] Decreased Swelling  []  []  []  []  [] Increased Fine Motor Skills []  []  []  []  [x] Increased ADL [place] []  []  [x]  []    Key Functional Changes: improved forearm ROM, improved elbow ROM, improved  strength, deficits in dominant hand pinch and  strength. Progress on current goals:     Goal:* Patient will have 70 degrees of right forearm supination in order to perform ADL's including washing face and accepting change. Status at PN 2022 - 67 deg  Status at PN 10/18/2022: 74 (+7) deg. Goal met. Goal:* Patient will have 130 degrees of right elbow flexion in order to perform hygiene tasks and /or work with tools such as a screwdriver. Status at PN 2022 - 122 deg  Status at PN 10/18/2022: 138 deg (+16) Goal met     Goal:* Pt will have 25 pounds of  in the right hand to allow for functional grasp for all ADL activities including dressing, bathing and self care.   Status at PN 2022 - 12#  Status at PN 10/18/2022: 39 (+27)# , Goal met. Goal:* Patient will show a 20 point improvement on FOTO functional status measure to improve overall functional performance. Status at eval: 26  Status at PN 9/16/2022 - 35 (+9)  Status at PN 10/18/2022: 57 (+31) Goal met    Updated Goals: to be achieved in 4 weeks:    Goal:* Pt will have 45 pounds of  in the right hand to allow for functional grasp for all ADL activities including dressing, bathing and self care. Status at PN 10/18/2022: 39# (+27)     Goal:* Patient will have improved right tip pinch strength of 12 pounds in order to perform fine motor tasks such as zippiing up zippers or opening containers. Status at PN 10/18/2022: 10#      Goal:* Patient will have improved right gisella pinch strength of 12 pounds in order to perform fine motor tasks such as writing. Status at PN 10/18/2022: 11#    ASSESSMENT/RECOMMENDATIONS: Continued skilled OT services are necessary to address strength deficits and improve quality of life.    [x]Continue therapy per initial plan/protocol at a frequency of  2 x per week for 4 weeks  []Continue therapy with the following recommended changes:_____________________      _____________________________________________________________________  []Discontinue therapy progressing towards or have reached established goals  []Discontinue therapy due to lack of appreciable progress towards goals  []Discontinue therapy due to lack of attendance or compliance  []Await Physician's recommendations/decisions regarding therapy  []Other:________________________________________________________________    Thank you for this referral.   KANG Putnam COTA/L  10/18/2022 2:21 PM  MAXI Bernstein/L  NOTE TO PHYSICIAN:  [address]   FAX TO [facility]: [phone]  If you are unable to process this request in 24 hours please contact our office: [phone]    I have read the above report and request that my patient continue as recommended. I have read the above report and request that my patient continue therapy with the following changes/special instructions:__________________________________________________________  I have read the above report and request that my patient be discharged from therapy.     [de-identified] Signature:____________Date:_________TIME:________    [facility], Date and Time must be completed for valid certification **

## 2022-10-18 NOTE — PROGRESS NOTES
OT DAILY TREATMENT NOTE     Patient Name: Piter Singh  Date:10/18/2022  : 1960  [x]  Patient  Verified  Payor: Maria Teresa Jeter / Plan: [address] [place] [address] [employer] / Product Type: HMO /    In time:1:52  Out time:2:31  Total Treatment Time (min): 39  Visit #: 7 of 8      Treatment Area: Pain in right elbow [M25.521]    SUBJECTIVE  Pain Level (0-10 scale): 0/10  Any medication changes, allergies to medications, adverse drug reactions, diagnosis change, or new procedure performed?: [x] No    [] Yes (see summary sheet for update)  Subjective functional status/changes:   [] No changes reported    \"No pain today\"  \"Was putting zip ties together this weekend\"  \"I road my bike this weekend\"    OBJECTIVE    Modality rationale: decrease pain and increase tissue extensibility to improve the patients ability to move right UE for functional daily tasks.     Min Type Additional Details       [] Estim:  []Unatt       []IFC  []Premod                        []Other:  []w/ice   []w/heat  Position:  Location:       [] Estim: []Att    []TENS instruct  []NMES                    []Other:  []w/US   []w/ice   []w/heat  Position:  Location:       []  Traction: [] Cervical       []Lumbar                       [] Prone          []Supine                       []Intermittent   []Continuous Lbs:  [] before manual  [] after manual       []  Ultrasound: []Continuous   [] Pulsed                           []1MHz   []3MHz W/cm2:  Location:       []  Iontophoresis with dexamethasone         Location: [] Take home patch   [] In clinic     10 concurrent  []  Ice     [x]  Heat- MHP   []  Ice massage  []  Laser   [x]  Paraffin Position: seated/resting  Location: right elbow and wrist/forearm.        []  Laser with stim  []  Other:  Position:  Location:       []  Vasopneumatic Device    []  Right     []  Left  Pre-treatment girth:  Post-treatment girth:  Measured at (location):  Pressure:       [] lo [] med [] hi   Temperature: [] lo [] med [] hi        [x] Skin assessment post-treatment:  [x]intact []redness- no adverse reaction    []redness - adverse reaction:       19 min Therapeutic Exercise:  [] See flow sheet :   Rationale: increase ROM and increase strength to improve the patients ability to move right elbow and lift and  for functional daily tasks. Right UE:     Prayer stretch   Large hammer stretch   Elbow PROM   Biceps 3 ways 3# , 5#  Small yellow bar 3 ways   L bar medium putty flatten  Elbow and wrist ROM measurements for progress   Pinch and  measurements for progress    10 min Self Care/Home Management: FOTO, treatment plan. Rationale:  patient education   to improve the patients ability to self-manage symptoms and improve functional use of right UE for daily tasks. With   [] TE   [] TA   [] neuro   [] other: Patient Education: [x] Review HEP    [x] Progressed/Changed HEP based on: independent with current ROM/PROM and strengthening HEP.    [] positioning   [] body mechanics   [] transfers   [] heat/ice application   [] Splint wear/care   [] Sensory re-education   [] scar management      [] other:            Other Objective/Functional Measures:     Range of Motion:   Elbow/Wrist   Wrist 8/19/2022  Right 9/16/2022  Right 10/18/2022  Right   Flex 43 34    Ext 71 72    UD 32      RD 27      FA         Pro 75 85    Sup 58 67 74 (+7)   Elbow        Flex 120 122 138 (+16)   Ext 18 1         Measurements: Taken with Frantz Dynamometer, in Lbs   Level 2 9/16/2022  10/18/2022   Right 12 39 (+27)   Left 53    Deficit      Change        Pinch Measurements: Taken with Pinch Gauge, in Lbs   (hand) 10/18/2022 Date Change   3 pt      Right 11     Left  17                   Lateral      Right      Left                    Tip      Right 10     Left 17                              Pain Level (0-10 scale) post treatment: 0/10    ASSESSMENT/Changes in Function: improved forearm ROM, improved elbow ROM, improved  strength, deficits in dominant hand pinch and  strength, improving ability to use right UE for functional daily tasks. Patient will continue to benefit from skilled OT services to address ROM deficits, address strength deficits, analyze and address soft tissue restrictions, analyze and cue movement patterns, and analyze and modify body mechanics/ergonomics to attain remaining goals. [x]  See Plan of Care  []  See progress note/recertification  []  See Discharge Summary         Progress towards goals / Updated goals:    Updated Goals: to be achieved in 4 weeks:  Goal:* Patient will have 70 degrees of right forearm supination in order to perform ADL's including washing face and accepting change. Status at PN 9/16/2022 - 67 deg  Status at PN 10/18/2022: 74 (+7) deg. Goal met. Goal:* Patient will have 130 degrees of right elbow flexion in order to perform hygiene tasks and /or work with tools such as a screwdriver. Status at PN 9/16/2022 - 122 deg  Status at PN 10/18/2022: 138 deg (+16) Goal met     Goal:* Pt will have 25 pounds of  in the right hand to allow for functional grasp for all ADL activities including dressing, bathing and self care. Status at PN 9/16/2022 - 12#  Status at PN 10/18/2022: 39 (+27)# , Goal met. Goal:* Patient will show a 20 point improvement on FOTO functional status measure to improve overall functional performance.   Status at eval: 26  Status at PN 9/16/2022 - 35 (+9)  Status at PN 10/18/2022: 57 (+31) Goal met    PLAN  []  Upgrade activities as tolerated     [x]  Continue plan of care  []  Update interventions per flow sheet       []  Discharge due to:_  []  Other:_      KANG Fernandez 10/18/2022  1:56 PM    Future Appointments   Date Time Provider Boyd Martinez   10/21/2022  4:00 PM KANG Osorio [facility]

## 2022-10-21 ENCOUNTER — HOSPITAL ENCOUNTER (OUTPATIENT)
Dept: PHYSICAL THERAPY | Age: 62
Discharge: HOME | End: 2022-10-21
Payer: COMMERCIAL

## 2022-10-21 PROCEDURE — 97110 THERAPEUTIC EXERCISES: CPT

## 2022-10-21 PROCEDURE — 97018 PARAFFIN BATH THERAPY: CPT

## 2022-10-21 NOTE — PROGRESS NOTES
OT DAILY TREATMENT NOTE     Patient Name: Aarti Galvin  Date:10/21/2022  : 1960  [x]  Patient  Verified  Payor: Daniel Los / Plan: [employer] / Product Type: HMO /    In time:4:08  Out time:4:46  Total Treatment Time (min): 38  Visit #: 1 of 8    Treatment Area: Pain in right elbow [M25.521]    SUBJECTIVE  Pain Level (0-10 scale): 0/10  Any medication changes, allergies to medications, adverse drug reactions, diagnosis change, or new procedure performed?: [x] No    [] Yes (see summary sheet for update)  Subjective functional status/changes:   [] No changes reported    \"I tried 5# at home and started to feel uncomfortable\"  \"I have been working with putty at home\"     OBJECTIVE    Modality rationale: decrease pain and increase tissue extensibility to improve the patients ability to move right UE for functional daily tasks.     Min Type Additional Details       [] Estim:  []Unatt       []IFC  []Premod                        []Other:  []w/ice   []w/heat  Position:  Location:       [] Estim: []Att    []TENS instruct  []NMES                    []Other:  []w/US   []w/ice   []w/heat  Position:  Location:       []  Traction: [] Cervical       []Lumbar                       [] Prone          []Supine                       []Intermittent   []Continuous Lbs:  [] before manual  [] after manual       []  Ultrasound: []Continuous   [] Pulsed                           []1MHz   []3MHz W/cm2:  Location:       []  Iontophoresis with dexamethasone         Location: [] Take home patch   [] In clinic     8   concurrent  []  Ice     [x]  Heat- MHP   []  Ice massage  []  Laser   [x]  Paraffin Position: seated/resting  Location: right elbow and wrist/forearm.        []  Laser with stim  []  Other:  Position:  Location:       []  Vasopneumatic Device    []  Right     []  Left  Pre-treatment girth:  Post-treatment girth:  Measured at (location):  Pressure:       [] lo [] med [] hi   Temperature: [] lo [] med [] hi [x] Skin assessment post-treatment:  [x]intact []redness- no adverse reaction    []redness - adverse reaction:     30 min Therapeutic Exercise:  [] See flow sheet :   Rationale: increase ROM and increase strength to improve the patients ability to move right elbow and lift and  for functional daily tasks. Right UE:     Towel slides on wall   Large hammer stretch   Weight well 2#   1\" peg placement into resistive foam board - peg removal with brown gripper 35#   Green resistive clothespin with foam pieces - tip pinch   Blue resistive clothespin with foam pieces - gisella pinch     With   [] TE   [] TA   [] neuro   [] other: Patient Education: [] Review HEP    [] Progressed/Changed HEP based on:   [] positioning   [] body mechanics   [] transfers   [] heat/ice application   [] Splint wear/care   [] Sensory re-education   [] scar management      [] other:            Other Objective/Functional Measures:     Late arrival to session - shortened session   Pt tolerated all right UE strengthening well. Self-initiated breaks between pinch strengthening exercises. Pain Level (0-10 scale) post treatment: 0/10    ASSESSMENT/Changes in Function: improving strength, no increase in pain with all right UE strengthening exercises, ongoing stiffness in right hand, pt is compliant with use of heat and compression gloves to manage symptoms. Patient will continue to benefit from skilled OT services to address ROM deficits, address strength deficits, analyze and address soft tissue restrictions, analyze and cue movement patterns, and analyze and modify body mechanics/ergonomics to attain remaining goals. [x]  See Plan of Care  []  See progress note/recertification  []  See Discharge Summary         Progress towards goals / Updated goals:    Goal:* Pt will have 45 pounds of  in the right hand to allow for functional grasp for all ADL activities including dressing, bathing and self care.   Status at PN 10/18/2022: 39# (+27)      Goal:* Patient will have improved right tip pinch strength of 12 pounds in order to perform fine motor tasks such as zippiing up zippers or opening containers. Status at PN 10/18/2022: 10#      Goal:* Patient will have improved right gisella pinch strength of 12 pounds in order to perform fine motor tasks such as writing.    Status at PN 10/18/2022: 11#       PLAN  []  Upgrade activities as tolerated     [x]  Continue plan of care  []  Update interventions per flow sheet       []  Discharge due to:_  []  Other:_      KANG Enriquez 10/21/2022  10:23 AM    Future Appointments   Date Time Provider Boyd Martinez   10/21/2022  4:00 PM KANG Cuevas [facility]

## 2022-10-28 ENCOUNTER — HOSPITAL ENCOUNTER (OUTPATIENT)
Dept: PHYSICAL THERAPY | Age: 62
Discharge: HOME | End: 2022-10-28
Payer: COMMERCIAL

## 2022-10-28 PROCEDURE — 97110 THERAPEUTIC EXERCISES: CPT

## 2022-10-28 PROCEDURE — 97018 PARAFFIN BATH THERAPY: CPT

## 2022-10-28 NOTE — PROGRESS NOTES
OT DAILY TREATMENT NOTE     Patient Name: Jolly Gordon  Date:10/28/2022  : 1960  [x]  Patient  Verified  Payor: Travon Gomez / Plan: [address] [place] [address] [place] [employer] / Product Type: HMO /    In time:3:25 PM  Out time:4:05 PM  Total Treatment Time (min): 40  Visit #: 2 of 8    Treatment Area: Pain in right elbow [M25.521]    SUBJECTIVE  Pain Level (0-10 scale): 0/10  Any medication changes, allergies to medications, adverse drug reactions, diagnosis change, or new procedure performed?: [x] No    [] Yes (see summary sheet for update)  Subjective functional status/changes:   [] No changes reported  \"I am working on the strengthening now and I am happy about that. \"    OBJECTIVE            Modality rationale: decrease pain and increase tissue extensibility to improve the patients ability to move right UE for functional daily tasks.     Min Type Additional Details       [] Estim:  []Unatt       []IFC  []Premod                        []Other:  []w/ice   []w/heat  Position:  Location:       [] Estim: []Att    []TENS instruct  []NMES                    []Other:  []w/US   []w/ice   []w/heat  Position:  Location:       []  Traction: [] Cervical       []Lumbar                       [] Prone          []Supine                       []Intermittent   []Continuous Lbs:  [] before manual  [] after manual       []  Ultrasound: []Continuous   [] Pulsed                           []1MHz   []3MHz W/cm2:  Location:       []  Iontophoresis with dexamethasone         Location: [] Take home patch   [] In clinic     10 []  Ice     [x]  Heat- MHP   []  Ice massage  []  Laser   [x]  Paraffin right hand Position: seated/resting  Location: right elbow and wrist/forearm.        []  Laser with stim  []  Other:  Position:  Location:       []  Vasopneumatic Device    []  Right     []  Left  Pre-treatment girth:  Post-treatment girth:  Measured at (location):  Pressure:       [] lo [] med [] hi   Temperature: [] lo [] med [] hi        [x] Skin assessment post-treatment:  [x]intact []redness- no adverse reaction    []redness - adverse reaction:      30 min Therapeutic Exercise:  [x] See flow sheet :   Rationale: increase ROM and increase strength to improve the patients ability to move right elbow and lift and  for functional daily tasks. Right UE:    Towel slides on wall  1# shoulder flexion  Biceps 3 way   Tbar 3 way - large yellow  Large hammer stretch  35# gripper for 50 peg retrieval  Wrist weight well 2#        With   [] TE   [] TA   [] neuro   [] other: Patient Education: [x] Review HEP    [] Progressed/Changed HEP based on:   [] positioning   [] body mechanics   [] transfers   [] heat/ice application   [] Splint wear/care   [] Sensory re-education   [] scar management      [] other:            Other Objective/Functional Measures: good tolerance to all PROM and strengthening activities for right UE     Pain Level (0-10 scale) post treatment: 0/10    ASSESSMENT/Changes in Function: Improving right UE strength and functional use. Gradual return to leisure tasks including riding the bike. Progressing well towards goals. Patient will continue to benefit from skilled OT services to modify and progress therapeutic interventions, address ROM deficits, address strength deficits, and instruct in home and community integration to attain remaining goals. []  See Plan of Care  []  See progress note/recertification  []  See Discharge Summary         Progress towards goals / Updated goals:  Goal:* Pt will have 45 pounds of  in the right hand to allow for functional grasp for all ADL activities including dressing, bathing and self care. Status at PN 10/18/2022: 39# (+27)      Goal:* Patient will have improved right tip pinch strength of 12 pounds in order to perform fine motor tasks such as zippiing up zippers or opening containers.     Status at PN 10/18/2022: 10#      Goal:* Patient will have improved right gisella pinch strength of 12 pounds in order to perform fine motor tasks such as writing.    Status at PN 10/18/2022: 11#    PLAN  [x]  Upgrade activities as tolerated     [x]  Continue plan of care  []  Update interventions per flow sheet       []  Discharge due to:_  []  Other:_      Jia Isabel OT 10/28/2022  3:28 PM    Future Appointments   Date Time Provider Boyd Martinez   11/3/2022  5:30 PM Reji Lazo OT [facility]   11/9/2022  5:30 PM Imelda Quintana KANG [facility]   11/11/2022  4:45 PM KANG Tran [facility]   11/15/2022  7:45 AM KANG Tran [facility]   11/18/2022  4:45 PM [place] KANG [facility]

## 2022-11-03 ENCOUNTER — HOSPITAL ENCOUNTER (OUTPATIENT)
Dept: PHYSICAL THERAPY | Age: 62
Discharge: HOME | End: 2022-11-03
Payer: COMMERCIAL

## 2022-11-03 PROCEDURE — 97110 THERAPEUTIC EXERCISES: CPT

## 2022-11-03 NOTE — PROGRESS NOTES
OT DAILY TREATMENT NOTE     Patient Name: Kermit Garcia  Date:11/3/2022  : 1960  [x]  Patient  Verified  Payor: Pito Gutierrez / Plan: [employer] / Product Type: HMO /    In time:5:34 PM  Out time:6:17 PM  Total Treatment Time (min): 43  Visit #: 3 of 8    Treatment Area: Pain in right elbow [M25.521]    SUBJECTIVE  Pain Level (0-10 scale): 0/10  Any medication changes, allergies to medications, adverse drug reactions, diagnosis change, or new procedure performed?: [x] No    [] Yes (see summary sheet for update)  Subjective functional status/changes:   [] No changes reported  \"I am good. \"    OBJECTIVE    43 min Therapeutic Exercise:  [x] See flow sheet :   Rationale: increase ROM and increase strength to improve the patients ability to move right elbow and lift and  for functional daily tasks. Right UE    UBE - Lv 1, 5 minutes  Towel slides on wall  1# shoulder flexion  Biceps 3 way 5#  Tbar 3 way - large yellow  Large hammer stretch  50# gripper for 50 peg retrieval  Wrist weight well 2#     With   [] TE   [] TA   [] neuro   [] other: Patient Education: [x] Review HEP    [] Progressed/Changed HEP based on:   [] positioning   [] body mechanics   [] transfers   [] heat/ice application   [] Splint wear/care   [] Sensory re-education   [] scar management      [] other:            Other Objective/Functional Measures: good tolerance to progressed resistive activities     Pain Level (0-10 scale) post treatment: 0/10    ASSESSMENT/Changes in Function: Increased resistive activities and initiated UBE for warm up. Patient will continue to benefit from skilled OT services to modify and progress therapeutic interventions, address ROM deficits, address strength deficits, and instruct in home and community integration to attain remaining goals.      []  See Plan of Care  []  See progress note/recertification  []  See Discharge Summary         Progress towards goals / Updated goals:  Goal:* Pt will have 45 pounds of  in the right hand to allow for functional grasp for all ADL activities including dressing, bathing and self care. Status at PN 10/18/2022: 39# (+27)   11/3/2022 - 50# gripper for one inch peg retrieval.      Goal:* Patient will have improved right tip pinch strength of 12 pounds in order to perform fine motor tasks such as zippiing up zippers or opening containers. Status at PN 10/18/2022: 10#      Goal:* Patient will have improved right gisella pinch strength of 12 pounds in order to perform fine motor tasks such as writing.    Status at PN 10/18/2022: 11#    PLAN  [x]  Upgrade activities as tolerated     [x]  Continue plan of care  []  Update interventions per flow sheet       []  Discharge due to:_  []  Other:_      Jaleesa Atwood OT 11/3/2022  5:16 PM    Future Appointments   Date Time Provider Boyd Martinez   11/3/2022  5:30 PM [facility]   11/9/2022  5:30 PM [facility]   11/11/2022  4:45 PM [facility]   11/15/2022  7:45 AM [facility]   11/18/2022  4:45 PM [facility] Reyes [facility]

## 2022-11-09 ENCOUNTER — HOSPITAL ENCOUNTER (OUTPATIENT)
Dept: PHYSICAL THERAPY | Age: 62
Discharge: HOME | End: 2022-11-09
Payer: COMMERCIAL

## 2022-11-09 PROCEDURE — 97110 THERAPEUTIC EXERCISES: CPT

## 2022-11-09 NOTE — PROGRESS NOTES
OT DAILY TREATMENT NOTE     Patient Name: Steven Melton  Date:2022  : 1960  [x]  Patient  Verified  Payor: Darrin Lucas / Plan: [employer] / Product Type: HMO /    In time:4:10  Out time:11:49  Total Treatment Time (min): 39  Visit #: 4 of 8    Treatment Area: Pain in right elbow [M25.521]    SUBJECTIVE  Pain Level (0-10 scale): 0/10  Any medication changes, allergies to medications, adverse drug reactions, diagnosis change, or new procedure performed?: [x] No    [] Yes (see summary sheet for update)  Subjective functional status/changes:   [] No changes reported    \"Wrist is a little sore today\"  \"I shoveled the other day and it was okay\"   \"I have been using 10 pounds for curls\"     OBJECTIVE    39 min Therapeutic Exercise:  [] See flow sheet :   Rationale: increase ROM and increase strength to improve the patients ability to move right elbow and lift and  for functional daily tasks.       Right UE:     UBE level 1 - 5 minutes   2# dumbbells shoulder flexion   Bicep curls 3 ways 5#  2# dumbbells Wrist flex/ext   Large hammer stretch   Large yellow t bar 3 ways   Green 5# digiflex- individual   Blue resistive clothespin - with foam pieces - gisella pinches   Green resistive clothespin- with foam pieces- tip pinch and lateral pinch   Triceps extension x10   Tennis ball squeeze/ with marbles       With   [] TE   [] TA   [] neuro   [] other: Patient Education: [] Review HEP    [] Progressed/Changed HEP based on:   [] positioning   [] body mechanics   [] transfers   [] heat/ice application   [] Splint wear/care   [] Sensory re-education   [] scar management      [] other:            Other Objective/Functional Measures:     Pt tolerated all UE strengthening exercises     Pain Level (0-10 scale) post treatment: 0/10    ASSESSMENT/Changes in Function: improving strength, no increase in pain with UE strengthening, initiated triceps strengthening and pt tolerated well, progress strengthening as tolerated. Patient will continue to benefit from skilled OT services to address ROM deficits, address strength deficits, analyze and address soft tissue restrictions, analyze and cue movement patterns, and analyze and modify body mechanics/ergonomics to attain remaining goals. [x]  See Plan of Care  []  See progress note/recertification  []  See Discharge Summary         Progress towards goals / Updated goals:    Goal:* Pt will have 45 pounds of  in the right hand to allow for functional grasp for all ADL activities including dressing, bathing and self care. Status at PN 10/18/2022: 39# (+27)   11/3/2022 - 50# gripper for one inch peg retrieval.      Goal:* Patient will have improved right tip pinch strength of 12 pounds in order to perform fine motor tasks such as zippiing up zippers or opening containers. Status at PN 10/18/2022: 10#      Goal:* Patient will have improved right gisella pinch strength of 12 pounds in order to perform fine motor tasks such as writing.    Status at PN 10/18/2022: 11#       PLAN  []  Upgrade activities as tolerated     [x]  Continue plan of care  []  Update interventions per flow sheet       []  Discharge due to:_  []  Other:_      KANG Reyes 11/9/2022  10:27 AM    Future Appointments   Date Time Provider Boyd Martinez   11/9/2022  4:00 [facility]   11/11/2022  4:45 PM [facility]   11/15/2022  7:45 AM [facility]   11/18/2022  4:45 PM KANG [facility]

## 2022-11-10 ENCOUNTER — HOSPITAL ENCOUNTER (OUTPATIENT)
Dept: PHYSICAL THERAPY | Age: 62
Discharge: HOME | End: 2022-11-10
Payer: COMMERCIAL

## 2022-11-10 PROCEDURE — 97110 THERAPEUTIC EXERCISES: CPT

## 2022-11-10 NOTE — PROGRESS NOTES
OT DAILY TREATMENT NOTE     Patient Name: Eloina Oreilly  Date:11/10/2022  : 1960  [x]  Patient  Verified  Payor: Luci Cost / Plan: [place] [employer] / Product Type: HMO /    In time:11:36  Out time:12:14  Total Treatment Time (min): 38  Visit #: 5 of 8    Medicare/BCBS Only   Total Timed Codes (min):  38 1:1 Treatment Time:  38     Treatment Area: Pain in right elbow [M25.521]    SUBJECTIVE  Pain Level (0-10 scale): 0/10  Any medication changes, allergies to medications, adverse drug reactions, diagnosis change, or new procedure performed?: [x] No    [] Yes (see summary sheet for update)  Subjective functional status/changes:   [] No changes reported    \"Felt okay after last time\"   \"Very tangible improvement\"     OBJECTIVE      38 min Therapeutic Exercise:  [] See flow sheet :   Rationale: increase ROM and increase strength to improve the patients ability to move right elbow and lift and  for functional daily tasks. Right UE:      UBE level 1 - 6 minutes   2# dumbbells shoulder flexion   Bicep curls 3 ways 5#  Triceps extension 2 #  2# dumbbells Wrist flex/ext   Large yellow tbar 3 ways   45# brown gripper - 1\" peg removal  5# green digiflex- individual   Blue resistive clothespin with foam pieces- gisella pinch   Translation with 1\" pegs - placement into resistive foam board. With   [] TE   [] TA   [] neuro   [] other: Patient Education: [] Review HEP    [] Progressed/Changed HEP based on:   [] positioning   [] body mechanics   [] transfers   [] heat/ice application   [] Splint wear/care   [] Sensory re-education   [] scar management      [] other:            Other Objective/Functional Measures:     Pt tolerated all strengthening exercises well.    No difficulty with resistive exercises     Pain Level (0-10 scale) post treatment: 0/10    ASSESSMENT/Changes in Function: improving strength and endurance with participation in strengthening exercises, no increase in pain with constant use of right UE for strengthening exercises , dicussed finalizing HEP's for discharge at the end of scheduled visits due to progress made on goals. Patient will continue to benefit from skilled OT services to address ROM deficits, address strength deficits, analyze and address soft tissue restrictions, analyze and cue movement patterns, and analyze and modify body mechanics/ergonomics to attain remaining goals. [x]  See Plan of Care  []  See progress note/recertification  []  See Discharge Summary         Progress towards goals / Updated goals:    Goal:* Pt will have 45 pounds of  in the right hand to allow for functional grasp for all ADL activities including dressing, bathing and self care. Status at PN 10/18/2022: 39# (+27)   11/3/2022 - 50# gripper for one inch peg retrieval.      Goal:* Patient will have improved right tip pinch strength of 12 pounds in order to perform fine motor tasks such as zippiing up zippers or opening containers. Status at PN 10/18/2022: 10#      Goal:* Patient will have improved right gisella pinch strength of 12 pounds in order to perform fine motor tasks such as writing.    Status at PN 10/18/2022: 11#        PLAN  []  Upgrade activities as tolerated     [x]  Continue plan of care  []  Update interventions per flow sheet       []  Discharge due to:_  []  Other:_      KANG Lockhart 11/10/2022  11:45 AM    Future Appointments   Date Time Provider Boyd Martinez   11/15/2022  7:45 AM KANG Dowell [facility]   11/18/2022  4:45 PM KANG Dowell [facility]

## 2022-11-11 ENCOUNTER — APPOINTMENT (OUTPATIENT)
Dept: PHYSICAL THERAPY | Age: 62
End: 2022-11-11
Payer: COMMERCIAL

## 2022-11-15 ENCOUNTER — HOSPITAL ENCOUNTER (OUTPATIENT)
Dept: PHYSICAL THERAPY | Age: 62
Discharge: HOME | End: 2022-11-15
Payer: COMMERCIAL

## 2022-11-15 PROCEDURE — 97110 THERAPEUTIC EXERCISES: CPT

## 2022-11-15 NOTE — PROGRESS NOTES
OT DAILY TREATMENT NOTE     Patient Name: Teresa Corona  Date:11/15/2022  : 1960  [x]  Patient  Verified  Payor: Mingo White / Plan: [place] [employer] / Product Type: HMO /    In time:7:52  Out time:8:34  Total Treatment Time (min): 42  Visit #: 6 of 8    Treatment Area: Pain in right elbow [M25.521]    SUBJECTIVE  Pain Level (0-10 scale): 0/10  Any medication changes, allergies to medications, adverse drug reactions, diagnosis change, or new procedure performed?: [x] No    [] Yes (see summary sheet for update)  Subjective functional status/changes:   [] No changes reported    \"No pain today, doing okay\"  \"I was able to catch myself the other day and made sure I didn't use my right arm to catch a chair that was falling\"      OBJECTIVE    42 min Therapeutic Exercise:  [] See flow sheet :   Rationale: increase ROM and increase strength to improve the patients ability to use right UE to lift and carry for functional daily tasks. Right UE:     UBE level 1 - 6 minutes   3# triceps extension   3# wrist flex/ext   5# biceps 3 ways   Large yellow t bar 3 ways   1\" peg placement into resistive foam board   45# brown gripper - 1\" peg removal   Green resistive clothespin 4# - tip and lat pinch - foam pieces  Blue resistive clothespin 6# - gisella pinch - foam pieces        With   [] TE   [] TA   [] neuro   [] other: Patient Education: [] Review HEP    [] Progressed/Changed HEP based on:   [] positioning   [] body mechanics   [] transfers   [] heat/ice application   [] Splint wear/care   [] Sensory re-education   [] scar management      [] other:            Other Objective/Functional Measures:     Pt tolerated all strengthening with no difficulty      Pain Level (0-10 scale) post treatment: 0/10    ASSESSMENT/Changes in Function: improving strength, no pain with UE strengthening exercises, will assess goals next visit and finalize HEP's for discharge.      Patient will continue to benefit from skilled OT services to address strength deficits and analyze and modify body mechanics/ergonomics to attain remaining goals. [x]  See Plan of Care  []  See progress note/recertification  []  See Discharge Summary         Progress towards goals / Updated goals:    Goal:* Pt will have 45 pounds of  in the right hand to allow for functional grasp for all ADL activities including dressing, bathing and self care. Status at PN 10/18/2022: 39# (+27)   11/3/2022 - 50# gripper for one inch peg retrieval.      Goal:* Patient will have improved right tip pinch strength of 12 pounds in order to perform fine motor tasks such as zippiing up zippers or opening containers. Status at PN 10/18/2022: 10#      Goal:* Patient will have improved right gisella pinch strength of 12 pounds in order to perform fine motor tasks such as writing.    Status at PN 10/18/2022: 11#       PLAN  []  Upgrade activities as tolerated     [x]  Continue plan of care  []  Update interventions per flow sheet       []  Discharge due to:_  []  Other:_      KANG Solorio 11/15/2022  7:51 AM    Future Appointments   Date Time Provider Boyd Martinez   11/18/2022  4:45 PM KANG Olmos [facility]

## 2022-12-21 NOTE — PROGRESS NOTES
[facility]  [address]  [place], [address]  [phone] fax    Occupational Therapy Discharge Summary    Patient name: Juliocesar Romero Start of Care: 2022   Referral source: Ana Diaz* : 1960   Medical/Treatment Diagnosis: Pain in right elbow [M25.521]  Payor: [employer] / Plan: [employer] [place] HMO / Product Type: HMO /  Onset Date:2022     Prior Hospitalization: see medical history Provider#: [id number]   Medications: Verified on Patient Summary List    Comorbidities: Heart disease, HTN  Prior Level of Function: (I) with ADL/IADL tasks without functional limitations and pain using right UE, walking, biking, cooking  Visits from Start of Care: 26    Missed Visits: 2  Reporting Period : 10/18/2022 to 11/15/2022    Summary of Care:  Goal:* Pt will have 45 pounds of  in the right hand to allow for functional grasp for all ADL activities including dressing, bathing and self care. Status at PN 10/18/2022: 39# (+27)   11/3/2022 - 50# gripper for one inch peg retrieval.      Goal:* Patient will have improved right tip pinch strength of 12 pounds in order to perform fine motor tasks such as zippiing up zippers or opening containers. Status at PN 10/18/2022: 10#      Goal:* Patient will have improved right gisella pinch strength of 12 pounds in order to perform fine motor tasks such as writing. Status at PN 10/18/2022: 11#      ASSESSMENT/RECOMMENDATIONS: Unable to further assess goals due to pt abdicated therapy. Planned for pt d/c on 22 as pt demonstrated improving strength, no pain with UE strengthening exercises upon last treatment session. Pt NS last treatment session and was unable to contact to reschedule. It has been greater than 30 days since this patient was seen at this facility. Will d/c at this time and reassess in the future if medically necessary.  Thank you for this referral.      [x]Discontinue therapy: [x]Patient has reached or is progressing toward set goals      [x]Patient is non-compliant or has abdicated      []Due to lack of appreciable progress towards set goals    Marvin Mejia OT 12/21/2022 8:49 AM